# Patient Record
Sex: FEMALE | Race: WHITE | NOT HISPANIC OR LATINO | URBAN - METROPOLITAN AREA
[De-identification: names, ages, dates, MRNs, and addresses within clinical notes are randomized per-mention and may not be internally consistent; named-entity substitution may affect disease eponyms.]

---

## 2021-06-14 ENCOUNTER — ON CAMPUS - OUTPATIENT (AMBULATORY)
Dept: URBAN - METROPOLITAN AREA HOSPITAL 2 | Facility: HOSPITAL | Age: 51
End: 2021-06-14

## 2021-06-14 VITALS
OXYGEN SATURATION: 100 % | DIASTOLIC BLOOD PRESSURE: 71 MMHG | SYSTOLIC BLOOD PRESSURE: 123 MMHG | RESPIRATION RATE: 18 BRPM | SYSTOLIC BLOOD PRESSURE: 109 MMHG | DIASTOLIC BLOOD PRESSURE: 70 MMHG | DIASTOLIC BLOOD PRESSURE: 83 MMHG | HEART RATE: 76 BPM | SYSTOLIC BLOOD PRESSURE: 122 MMHG | TEMPERATURE: 97.7 F | OXYGEN SATURATION: 99 % | SYSTOLIC BLOOD PRESSURE: 112 MMHG | DIASTOLIC BLOOD PRESSURE: 75 MMHG | DIASTOLIC BLOOD PRESSURE: 61 MMHG | HEART RATE: 57 BPM | HEART RATE: 60 BPM | SYSTOLIC BLOOD PRESSURE: 128 MMHG | WEIGHT: 155 LBS | OXYGEN SATURATION: 96 % | SYSTOLIC BLOOD PRESSURE: 117 MMHG | HEIGHT: 66 IN | SYSTOLIC BLOOD PRESSURE: 100 MMHG | OXYGEN SATURATION: 95 % | SYSTOLIC BLOOD PRESSURE: 105 MMHG | DIASTOLIC BLOOD PRESSURE: 81 MMHG | DIASTOLIC BLOOD PRESSURE: 86 MMHG | HEART RATE: 72 BPM | RESPIRATION RATE: 16 BRPM | HEART RATE: 84 BPM | HEART RATE: 54 BPM

## 2021-06-14 DIAGNOSIS — Z12.11 ENCOUNTER FOR SCREENING FOR MALIGNANT NEOPLASM OF COLON: ICD-10-CM

## 2021-06-14 DIAGNOSIS — K64.1 SECOND DEGREE HEMORRHOIDS: ICD-10-CM

## 2021-06-14 PROCEDURE — 45378 DIAGNOSTIC COLONOSCOPY: CPT | Mod: 33 | Performed by: INTERNAL MEDICINE

## 2023-01-10 ENCOUNTER — OFFICE VISIT (OUTPATIENT)
Dept: FAMILY MEDICINE CLINIC | Facility: CLINIC | Age: 53
End: 2023-01-10
Payer: COMMERCIAL

## 2023-01-10 ENCOUNTER — PATIENT ROUNDING (BHMG ONLY) (OUTPATIENT)
Dept: FAMILY MEDICINE CLINIC | Facility: CLINIC | Age: 53
End: 2023-01-10

## 2023-01-10 VITALS
HEIGHT: 66 IN | BODY MASS INDEX: 23.46 KG/M2 | WEIGHT: 146 LBS | TEMPERATURE: 98 F | DIASTOLIC BLOOD PRESSURE: 90 MMHG | SYSTOLIC BLOOD PRESSURE: 120 MMHG | OXYGEN SATURATION: 98 % | HEART RATE: 65 BPM | RESPIRATION RATE: 18 BRPM

## 2023-01-10 DIAGNOSIS — Z13.0 SCREENING FOR ENDOCRINE, METABOLIC AND IMMUNITY DISORDER: ICD-10-CM

## 2023-01-10 DIAGNOSIS — E03.9 HYPOTHYROIDISM, UNSPECIFIED TYPE: ICD-10-CM

## 2023-01-10 DIAGNOSIS — Z13.220 SCREENING FOR LIPID DISORDERS: ICD-10-CM

## 2023-01-10 DIAGNOSIS — Z76.89 ENCOUNTER TO ESTABLISH CARE: ICD-10-CM

## 2023-01-10 DIAGNOSIS — Z13.29 SCREENING FOR ENDOCRINE, METABOLIC AND IMMUNITY DISORDER: ICD-10-CM

## 2023-01-10 DIAGNOSIS — Z13.228 SCREENING FOR ENDOCRINE, METABOLIC AND IMMUNITY DISORDER: ICD-10-CM

## 2023-01-10 DIAGNOSIS — Z13.29 SCREENING FOR THYROID DISORDER: ICD-10-CM

## 2023-01-10 DIAGNOSIS — F41.9 ANXIETY: Primary | ICD-10-CM

## 2023-01-10 DIAGNOSIS — F32.A DEPRESSION, UNSPECIFIED DEPRESSION TYPE: ICD-10-CM

## 2023-01-10 DIAGNOSIS — Z13.1 SCREENING FOR DIABETES MELLITUS: ICD-10-CM

## 2023-01-10 PROBLEM — N85.9 LESION OF ENDOMETRIUM: Status: ACTIVE | Noted: 2023-01-10

## 2023-01-10 PROBLEM — C50.919 MALIGNANT NEOPLASM OF BREAST: Status: ACTIVE | Noted: 2023-01-10

## 2023-01-10 PROBLEM — Z79.810 LONG TERM CURRENT USE OF SELECTIVE ESTROGEN RECEPTOR MODULATORS (SERMS): Status: ACTIVE | Noted: 2022-07-13

## 2023-01-10 PROBLEM — Z90.12 ACQUIRED ABSENCE OF LEFT BREAST: Status: ACTIVE | Noted: 2020-03-26

## 2023-01-10 PROBLEM — N95.0 POSTMENOPAUSAL BLEEDING: Status: ACTIVE | Noted: 2022-07-13

## 2023-01-10 PROBLEM — D05.12 DUCTAL CARCINOMA IN SITU (DCIS) OF LEFT BREAST: Status: ACTIVE | Noted: 2020-04-22

## 2023-01-10 PROBLEM — Z79.810 SERM USE (SELECTIVE ESTROGEN RECEPTOR MODULATOR): Status: ACTIVE | Noted: 2020-10-05

## 2023-01-10 PROCEDURE — 99205 OFFICE O/P NEW HI 60 MIN: CPT | Performed by: REGISTERED NURSE

## 2023-01-10 RX ORDER — BUSPIRONE HYDROCHLORIDE 5 MG/1
5 TABLET ORAL 3 TIMES DAILY PRN
Qty: 60 TABLET | Refills: 3 | Status: SHIPPED | OUTPATIENT
Start: 2023-01-10

## 2023-01-10 RX ORDER — SERTRALINE HYDROCHLORIDE 25 MG/1
25 TABLET, FILM COATED ORAL DAILY
Qty: 30 TABLET | Refills: 5 | Status: SHIPPED | OUTPATIENT
Start: 2023-01-10 | End: 2023-02-02

## 2023-01-10 RX ORDER — LEVOTHYROXINE SODIUM 100 MCG
1 TABLET ORAL DAILY
COMMUNITY
Start: 2022-12-10 | End: 2023-02-07

## 2023-01-10 RX ORDER — LEVOTHYROXINE SODIUM 0.1 MG/1
100 TABLET ORAL DAILY
COMMUNITY
Start: 2022-09-08 | End: 2023-02-07

## 2023-01-10 RX ORDER — ASPIRIN 81 MG
100 TABLET, DELAYED RELEASE (ENTERIC COATED) ORAL
COMMUNITY
Start: 2022-09-13

## 2023-01-10 RX ORDER — IBUPROFEN 600 MG/1
600 TABLET ORAL
COMMUNITY
Start: 2022-09-13

## 2023-01-25 ENCOUNTER — CLINICAL SUPPORT (OUTPATIENT)
Dept: FAMILY MEDICINE CLINIC | Facility: CLINIC | Age: 53
End: 2023-01-25
Payer: COMMERCIAL

## 2023-01-25 DIAGNOSIS — Z13.29 SCREENING FOR THYROID DISORDER: ICD-10-CM

## 2023-01-25 DIAGNOSIS — Z13.220 SCREENING FOR LIPID DISORDERS: ICD-10-CM

## 2023-01-25 DIAGNOSIS — Z13.228 SCREENING FOR ENDOCRINE, METABOLIC AND IMMUNITY DISORDER: ICD-10-CM

## 2023-01-25 DIAGNOSIS — Z13.1 SCREENING FOR DIABETES MELLITUS: ICD-10-CM

## 2023-01-25 DIAGNOSIS — Z13.0 SCREENING FOR ENDOCRINE, METABOLIC AND IMMUNITY DISORDER: ICD-10-CM

## 2023-01-25 DIAGNOSIS — Z13.29 SCREENING FOR ENDOCRINE, METABOLIC AND IMMUNITY DISORDER: ICD-10-CM

## 2023-01-25 LAB — HBA1C MFR BLD: 5.2 % (ref 3.5–5.6)

## 2023-01-25 PROCEDURE — 80050 GENERAL HEALTH PANEL: CPT | Performed by: REGISTERED NURSE

## 2023-01-25 PROCEDURE — 36415 COLL VENOUS BLD VENIPUNCTURE: CPT | Performed by: REGISTERED NURSE

## 2023-01-25 PROCEDURE — 83036 HEMOGLOBIN GLYCOSYLATED A1C: CPT | Performed by: REGISTERED NURSE

## 2023-01-25 PROCEDURE — 80061 LIPID PANEL: CPT | Performed by: REGISTERED NURSE

## 2023-01-26 LAB
ALBUMIN SERPL-MCNC: 4.2 G/DL (ref 3.5–5.2)
ALBUMIN/GLOB SERPL: 1.4 G/DL
ALP SERPL-CCNC: 107 U/L (ref 39–117)
ALT SERPL W P-5'-P-CCNC: 18 U/L (ref 1–33)
ANION GAP SERPL CALCULATED.3IONS-SCNC: 9.3 MMOL/L (ref 5–15)
AST SERPL-CCNC: 22 U/L (ref 1–32)
BASOPHILS # BLD AUTO: 0.02 10*3/MM3 (ref 0–0.2)
BASOPHILS NFR BLD AUTO: 0.4 % (ref 0–1.5)
BILIRUB SERPL-MCNC: 0.2 MG/DL (ref 0–1.2)
BUN SERPL-MCNC: 25 MG/DL (ref 6–20)
BUN/CREAT SERPL: 28.1 (ref 7–25)
CALCIUM SPEC-SCNC: 9.3 MG/DL (ref 8.6–10.5)
CHLORIDE SERPL-SCNC: 105 MMOL/L (ref 98–107)
CHOLEST SERPL-MCNC: 208 MG/DL (ref 0–200)
CO2 SERPL-SCNC: 29.7 MMOL/L (ref 22–29)
CREAT SERPL-MCNC: 0.89 MG/DL (ref 0.57–1)
DEPRECATED RDW RBC AUTO: 38.2 FL (ref 37–54)
EGFRCR SERPLBLD CKD-EPI 2021: 78.6 ML/MIN/1.73
EOSINOPHIL # BLD AUTO: 0.06 10*3/MM3 (ref 0–0.4)
EOSINOPHIL NFR BLD AUTO: 1.1 % (ref 0.3–6.2)
ERYTHROCYTE [DISTWIDTH] IN BLOOD BY AUTOMATED COUNT: 11.8 % (ref 12.3–15.4)
GLOBULIN UR ELPH-MCNC: 3.1 GM/DL
GLUCOSE SERPL-MCNC: 80 MG/DL (ref 65–99)
HCT VFR BLD AUTO: 40.6 % (ref 34–46.6)
HDLC SERPL-MCNC: 74 MG/DL (ref 40–60)
HGB BLD-MCNC: 13.6 G/DL (ref 12–15.9)
IMM GRANULOCYTES # BLD AUTO: 0.01 10*3/MM3 (ref 0–0.05)
IMM GRANULOCYTES NFR BLD AUTO: 0.2 % (ref 0–0.5)
LDLC SERPL CALC-MCNC: 120 MG/DL (ref 0–100)
LDLC/HDLC SERPL: 1.6 {RATIO}
LYMPHOCYTES # BLD AUTO: 1.12 10*3/MM3 (ref 0.7–3.1)
LYMPHOCYTES NFR BLD AUTO: 21.1 % (ref 19.6–45.3)
MCH RBC QN AUTO: 29.6 PG (ref 26.6–33)
MCHC RBC AUTO-ENTMCNC: 33.5 G/DL (ref 31.5–35.7)
MCV RBC AUTO: 88.3 FL (ref 79–97)
MONOCYTES # BLD AUTO: 0.53 10*3/MM3 (ref 0.1–0.9)
MONOCYTES NFR BLD AUTO: 10 % (ref 5–12)
NEUTROPHILS NFR BLD AUTO: 3.57 10*3/MM3 (ref 1.7–7)
NEUTROPHILS NFR BLD AUTO: 67.2 % (ref 42.7–76)
NRBC BLD AUTO-RTO: 0 /100 WBC (ref 0–0.2)
PLATELET # BLD AUTO: 208 10*3/MM3 (ref 140–450)
PMV BLD AUTO: 10.9 FL (ref 6–12)
POTASSIUM SERPL-SCNC: 4.3 MMOL/L (ref 3.5–5.2)
PROT SERPL-MCNC: 7.3 G/DL (ref 6–8.5)
RBC # BLD AUTO: 4.6 10*6/MM3 (ref 3.77–5.28)
SODIUM SERPL-SCNC: 144 MMOL/L (ref 136–145)
TRIGL SERPL-MCNC: 79 MG/DL (ref 0–150)
TSH SERPL DL<=0.05 MIU/L-ACNC: 0.23 UIU/ML (ref 0.27–4.2)
VLDLC SERPL-MCNC: 14 MG/DL (ref 5–40)
WBC NRBC COR # BLD: 5.31 10*3/MM3 (ref 3.4–10.8)

## 2023-02-02 DIAGNOSIS — F41.9 ANXIETY: ICD-10-CM

## 2023-02-02 DIAGNOSIS — F32.A DEPRESSION, UNSPECIFIED DEPRESSION TYPE: ICD-10-CM

## 2023-02-02 RX ORDER — SERTRALINE HYDROCHLORIDE 25 MG/1
TABLET, FILM COATED ORAL
Qty: 30 TABLET | Refills: 5 | Status: SHIPPED | OUTPATIENT
Start: 2023-02-02

## 2023-02-07 RX ORDER — LEVOTHYROXINE SODIUM 88 UG/1
88 TABLET ORAL DAILY
Qty: 30 TABLET | Refills: 5 | Status: SHIPPED | OUTPATIENT
Start: 2023-02-07 | End: 2023-02-20 | Stop reason: SDUPTHER

## 2023-02-20 DIAGNOSIS — E03.9 HYPOTHYROIDISM, UNSPECIFIED TYPE: ICD-10-CM

## 2023-02-20 NOTE — TELEPHONE ENCOUNTER
Pt is wanting a script written for NAME brand only on her thyroid medication. She wants it filled as Synthroid 88 MCG.

## 2023-02-21 RX ORDER — LEVOTHYROXINE SODIUM 88 MCG
88 TABLET ORAL DAILY
Qty: 30 TABLET | Refills: 5 | Status: SHIPPED | OUTPATIENT
Start: 2023-02-21 | End: 2023-03-16

## 2023-03-13 ENCOUNTER — OFFICE VISIT (OUTPATIENT)
Dept: FAMILY MEDICINE CLINIC | Facility: CLINIC | Age: 53
End: 2023-03-13
Payer: COMMERCIAL

## 2023-03-13 VITALS
HEART RATE: 61 BPM | TEMPERATURE: 98.1 F | OXYGEN SATURATION: 96 % | BODY MASS INDEX: 23.46 KG/M2 | RESPIRATION RATE: 18 BRPM | WEIGHT: 146 LBS | DIASTOLIC BLOOD PRESSURE: 78 MMHG | HEIGHT: 66 IN | SYSTOLIC BLOOD PRESSURE: 120 MMHG

## 2023-03-13 DIAGNOSIS — E03.9 HYPOTHYROIDISM, UNSPECIFIED TYPE: Primary | ICD-10-CM

## 2023-03-13 DIAGNOSIS — F41.9 ANXIETY: ICD-10-CM

## 2023-03-13 DIAGNOSIS — F32.A DEPRESSION, UNSPECIFIED DEPRESSION TYPE: ICD-10-CM

## 2023-03-13 PROCEDURE — 99215 OFFICE O/P EST HI 40 MIN: CPT | Performed by: REGISTERED NURSE
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
## 2023-03-16 DIAGNOSIS — E03.9 HYPOTHYROIDISM, UNSPECIFIED TYPE: ICD-10-CM

## 2023-03-16 RX ORDER — LEVOTHYROXINE SODIUM 88 MCG
TABLET ORAL
Qty: 30 TABLET | Refills: 5 | Status: SHIPPED | OUTPATIENT
Start: 2023-03-16

## 2023-03-20 ENCOUNTER — CLINICAL SUPPORT (OUTPATIENT)
Dept: FAMILY MEDICINE CLINIC | Facility: CLINIC | Age: 53
End: 2023-03-20
Payer: COMMERCIAL

## 2023-03-20 DIAGNOSIS — E03.9 HYPOTHYROIDISM, UNSPECIFIED TYPE: ICD-10-CM

## 2023-03-20 DIAGNOSIS — Z13.29 SCREENING FOR THYROID DISORDER: ICD-10-CM

## 2023-03-20 PROCEDURE — 36415 COLL VENOUS BLD VENIPUNCTURE: CPT | Performed by: REGISTERED NURSE

## 2023-03-20 PROCEDURE — 84443 ASSAY THYROID STIM HORMONE: CPT | Performed by: REGISTERED NURSE

## 2023-03-20 NOTE — PROGRESS NOTES
Venipuncture Blood Specimen Collection  Venipuncture performed in R ARM by Liz Callejas MA with good hemostasis. Patient tolerated the procedure well without complications.   03/20/23   Liz Callejas MA

## 2023-03-21 LAB — TSH SERPL DL<=0.05 MIU/L-ACNC: 0.41 UIU/ML (ref 0.27–4.2)

## 2023-03-22 ENCOUNTER — TELEPHONE (OUTPATIENT)
Dept: FAMILY MEDICINE CLINIC | Facility: CLINIC | Age: 53
End: 2023-03-22

## 2023-03-22 NOTE — TELEPHONE ENCOUNTER
Caller: Sabiha Hernandez    Relationship to patient: Self    Best call back number: [phone]    Patient is needing: LAB RESULTS

## 2023-05-08 ENCOUNTER — TELEPHONE (OUTPATIENT)
Dept: FAMILY MEDICINE CLINIC | Facility: CLINIC | Age: 53
End: 2023-05-08

## 2023-05-08 NOTE — TELEPHONE ENCOUNTER
Caller: Sabiha Hernandez    Relationship to patient: Self    Best call back number: [phone]    Chief complaint: WAS CHECKED FOR COVID, ADVISED TO GET A PULMONARY TEST WITH PCP    Type of visit: HOSPITAL FOLLOW UP    Requested date: NEXT AVAILABLE    Additional notes: PATIENT STATES SHE WAS DISCHARGED FROM [facility] ON 04/25/23, DUE TO GETTING CHECKED FOR COVID AND WAS ADVISED TO COMPLETE A PULMONARY TEST ABOUT 4 WEEKS FROM 04/25/23. [facility] WAS UNABLE TO SCHEDULE PATIENT'S HOSPITAL FOLLOW UP DUE TO IT BEING OUT OF 7 DAY TIME FRAME, PER [facility] WORKFLOW.     [facility] ATTEMPTED TO WARM TRANSFER AND WAS UNSUCCESSFUL

## 2023-05-22 ENCOUNTER — OFFICE VISIT (OUTPATIENT)
Dept: FAMILY MEDICINE CLINIC | Facility: CLINIC | Age: 53
End: 2023-05-22
Payer: COMMERCIAL

## 2023-05-22 VITALS
TEMPERATURE: 98.5 F | HEIGHT: 66 IN | RESPIRATION RATE: 18 BRPM | HEART RATE: 59 BPM | BODY MASS INDEX: 23.78 KG/M2 | SYSTOLIC BLOOD PRESSURE: 102 MMHG | DIASTOLIC BLOOD PRESSURE: 80 MMHG | OXYGEN SATURATION: 98 % | WEIGHT: 148 LBS

## 2023-05-22 DIAGNOSIS — R05.1 ACUTE COUGH: ICD-10-CM

## 2023-05-22 DIAGNOSIS — R07.81 RIB PAIN ON RIGHT SIDE: Primary | ICD-10-CM

## 2023-05-22 DIAGNOSIS — R93.89 ABNORMAL X-RAY: ICD-10-CM
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
## 2023-07-11 ENCOUNTER — TELEPHONE (OUTPATIENT)
Dept: FAMILY MEDICINE CLINIC | Facility: CLINIC | Age: 53
End: 2023-07-11

## 2023-07-11 NOTE — TELEPHONE ENCOUNTER
Caller: Sabiha Hernandez    Relationship to patient: Self    Best call back number: [phone]    Patient is needing:     WOULD LIKE TO HAVE THE ORDER FOR CT SCAN AND THE AUTHORIZATION FAXED TO [facility]. THEY ARE STATING THE HAVE NOT RECEIVED IT.

## 2023-07-24 ENCOUNTER — CLINICAL SUPPORT (OUTPATIENT)
Dept: FAMILY MEDICINE CLINIC | Facility: CLINIC | Age: 53
End: 2023-07-24
Payer: COMMERCIAL

## 2023-07-24 DIAGNOSIS — S01.01XD LACERATION OF SCALP WITHOUT FOREIGN BODY, SUBSEQUENT ENCOUNTER: Primary | ICD-10-CM

## 2023-07-24 DIAGNOSIS — L08.9 SKIN INFECTION: ICD-10-CM

## 2023-07-24 DIAGNOSIS — L24.A9 DRAINAGE FROM WOUND: ICD-10-CM

## 2023-07-24 RX ORDER — SULFAMETHOXAZOLE AND TRIMETHOPRIM 800; 160 MG/1; MG/1
1 TABLET ORAL 2 TIMES DAILY
Qty: 20 TABLET | Refills: 0 | Status: SHIPPED | OUTPATIENT
Start: 2023-07-24 | End: 2023-08-03
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
## 2023-09-07 ENCOUNTER — TELEPHONE (OUTPATIENT)
Dept: FAMILY MEDICINE CLINIC | Facility: CLINIC | Age: 53
End: 2023-09-07

## 2023-09-07 DIAGNOSIS — Z12.31 ENCOUNTER FOR SCREENING MAMMOGRAM FOR MALIGNANT NEOPLASM OF BREAST: Primary | ICD-10-CM

## 2023-09-07 NOTE — TELEPHONE ENCOUNTER
Caller: Sabiha Hernandez    Relationship: Self    Best call back number: [phone] (Mobile)     What orders are you requesting (i.e. lab or imaging): MAMMOGRAM     In what timeframe would the patient need to come in:     Where will you receive your lab/imaging services: [facility]     Additional notes:

## 2023-09-11 NOTE — TELEPHONE ENCOUNTER
HUB IS INSTRUCTED TO READ BELOW MESSAGE     LVM FOR PT LETTING HER KNOW THAT MAMMO ORDER HAS BEEN [facility]

## 2023-10-20 DIAGNOSIS — E03.9 HYPOTHYROIDISM, UNSPECIFIED TYPE: ICD-10-CM

## 2023-10-20 RX ORDER — LEVOTHYROXINE SODIUM 88 MCG
88 TABLET ORAL DAILY
Qty: 30 TABLET | Refills: 5 | Status: SHIPPED | OUTPATIENT
Start: 2023-10-20

## 2023-10-27 ENCOUNTER — OFFICE VISIT (OUTPATIENT)
Dept: FAMILY MEDICINE CLINIC | Facility: CLINIC | Age: 53
End: 2023-10-27
Payer: COMMERCIAL

## 2023-10-27 VITALS
WEIGHT: 141 LBS | SYSTOLIC BLOOD PRESSURE: 100 MMHG | OXYGEN SATURATION: 96 % | DIASTOLIC BLOOD PRESSURE: 60 MMHG | RESPIRATION RATE: 18 BRPM | HEIGHT: 66 IN | BODY MASS INDEX: 22.66 KG/M2 | TEMPERATURE: 97.8 F | HEART RATE: 86 BPM

## 2023-10-27 DIAGNOSIS — Z13.220 SCREENING FOR LIPID DISORDERS: ICD-10-CM

## 2023-10-27 DIAGNOSIS — E03.9 HYPOTHYROIDISM, UNSPECIFIED TYPE: ICD-10-CM

## 2023-10-27 DIAGNOSIS — Z13.29 SCREENING FOR ENDOCRINE, METABOLIC AND IMMUNITY DISORDER: ICD-10-CM

## 2023-10-27 DIAGNOSIS — Z23 NEED FOR IMMUNIZATION AGAINST INFLUENZA: Primary | ICD-10-CM

## 2023-10-27 DIAGNOSIS — Z13.228 SCREENING FOR ENDOCRINE, METABOLIC AND IMMUNITY DISORDER: ICD-10-CM

## 2023-10-27 DIAGNOSIS — Z13.0 SCREENING FOR ENDOCRINE, METABOLIC AND IMMUNITY DISORDER: ICD-10-CM

## 2023-10-27 PROBLEM — Z12.11 SCREEN FOR COLON CANCER: Status: ACTIVE | Noted: 2023-10-27

## 2023-10-27 NOTE — PROGRESS NOTES
Chief Complaint  Follow-up, Med Refill, and Immunizations (Patient is wanting flu vaccine today. )    Subjective    History of Present Illness {CC  Problem List  Visit  Diagnosis   Encounters  Notes  Medications  Labs  Result Review Imaging  Media :23}     Sabiha Hernandez presents to [facility] for Follow-up, Med Refill, and Immunizations (Patient is wanting flu vaccine today. ).      History of Present Illness  Patient is a 53 y.o. female  presents to the clinic today for 3-month follow-up for hypothyroidism.  Patient denies any chest pain, shortness of breath, or any fevers.  Patient denies any known exposure to COVID, flu, or any other contagious illnesses.    In regards to hypothyroidism, she currently takes synthroid 88 mcg daily to manage her hypothyroidism. She started this approximately 9 months ago due to her TSH level being low. She has no issues to report. She takes her medication as prescribed.  Patient has no questions or concerns in regards to her hypothyroidism or her Synthroid at this time.    In regards to depression with anxiety, patient shares that she no longer needs this medication.  She has reported that she has stopped taking her antidepressant and anxiety medication as circumstances have improved significantly for her.    We discussed getting flu shot today.  Patient is agreeable to flu shot at this time.  Education provided in regards to flu vaccination.  Patient would like to come back tomorrow fasting for her blood work.        Review of Systems   Constitutional: Negative.  Negative for activity change, appetite change, chills, diaphoresis, fatigue and fever.   HENT: Negative.  Negative for congestion, dental problem, ear pain, hearing loss, rhinorrhea, sinus pain, sore throat, tinnitus and trouble swallowing.    Eyes: Negative.  Negative for pain and visual disturbance.   Respiratory: Negative.  Negative for cough, chest tightness, shortness of breath  "and wheezing.    Cardiovascular: Negative.  Negative for chest pain, palpitations and leg swelling.   Gastrointestinal: Negative.  Negative for abdominal pain, diarrhea, nausea and vomiting.   Endocrine: Negative.  Negative for polydipsia, polyphagia and polyuria.   Genitourinary: Negative.  Negative for difficulty urinating, dysuria, frequency and urgency.   Musculoskeletal: Negative.  Negative for arthralgias, back pain and myalgias.   Skin: Negative.  Negative for color change, pallor, rash and wound.   Allergic/Immunologic: Negative.  Negative for environmental allergies.   Neurological: Negative.  Negative for dizziness, tremors, speech difficulty, weakness, light-headedness, numbness and headaches.   Hematological: Negative.    Psychiatric/Behavioral: Negative.  Negative for confusion, decreased concentration, self-injury, sleep disturbance and suicidal ideas. The patient is not nervous/anxious.    All other systems reviewed and are negative.       Objective     Vital Signs:   /60 (BP Location: Left arm, Patient Position: Sitting, Cuff Size: Adult)   Pulse 86   Temp 97.8 °F (36.6 °C) (Oral)   Resp 18   Ht 167.6 cm (66\")   Wt 64 kg (141 lb)   SpO2 96%   BMI 22.76 kg/m²   Current Outpatient Medications on File Prior to Visit   Medication Sig Dispense Refill    Synthroid 88 MCG tablet Take 1 tablet by mouth Daily. 30 tablet 5    [DISCONTINUED] busPIRone (BUSPAR) 5 MG tablet Take 1 tablet by mouth 3 (Three) Times a Day As Needed (anxiety). (Patient not taking: Reported on 10/27/2023) 60 tablet 3    [DISCONTINUED] Cholecalciferol (VITAMIN D3 PO) Take  by mouth. (Patient not taking: Reported on 10/27/2023)      [DISCONTINUED] sertraline (ZOLOFT) 25 MG tablet TAKE 1 TABLET BY MOUTH EVERY DAY (Patient not taking: Reported on 5/22/2023) 30 tablet 5     No current facility-administered medications on file prior to visit.        Past Medical History:   Diagnosis Date    Anxiety     Breast cancer in female  "    left breast    Cancer     Depression     Headache     Hypothyroidism       Past Surgical History:   Procedure Laterality Date    BREAST SURGERY Left     HYSTERECTOMY      complete    MASTECTOMY COMPLETE / SIMPLE Left     TOTAL THYROIDECTOMY      TUBAL ABDOMINAL LIGATION        Family History   Problem Relation Age of Onset    Thyroid disease Mother     Hypertension Mother     Stroke Father     Heart disease Father     Diabetes Brother     Cancer Maternal Grandmother     Colon cancer Maternal Grandmother     COPD Maternal Grandfather     Heart disease Paternal Grandmother     Heart disease Paternal Grandfather       Social History     Socioeconomic History    Marital status:    Tobacco Use    Smoking status: Former     Packs/day: 1.00     Years: 31.00     Additional pack years: 0.00     Total pack years: 31.00     Types: Cigarettes     Start date:      Quit date: 2017     Years since quittin.9    Smokeless tobacco: Never   Substance and Sexual Activity    Alcohol use: Not Currently    Drug use: Never    Sexual activity: Defer         No visits with results within 3 Month(s) from this visit.   Latest known visit with results is:   Clinical Support on 2023   Component Date Value Ref Range Status    TSH 2023 0.415  0.270 - 4.200 uIU/mL Final         Physical Exam  Vitals and nursing note reviewed.   Constitutional:       Appearance: Normal appearance. She is normal weight.   HENT:      Head: Normocephalic and atraumatic.      Nose: Nose normal.   Eyes:      Extraocular Movements: Extraocular movements intact.      Conjunctiva/sclera: Conjunctivae normal.   Cardiovascular:      Rate and Rhythm: Normal rate and regular rhythm.      Pulses: Normal pulses.      Heart sounds: Normal heart sounds. No murmur heard.     No friction rub. No gallop.   Pulmonary:      Effort: Pulmonary effort is normal. No respiratory distress.      Breath sounds: Normal breath sounds. No stridor. No wheezing, rhonchi  or rales.   Chest:      Chest wall: No tenderness.   Abdominal:      General: Abdomen is flat. Bowel sounds are normal. There is no distension.      Palpations: Abdomen is soft. There is no mass.      Tenderness: There is no abdominal tenderness. There is no right CVA tenderness, left CVA tenderness, guarding or rebound.      Hernia: No hernia is present.   Musculoskeletal:         General: Normal range of motion.      Cervical back: Normal range of motion.   Skin:     General: Skin is warm and dry.      Capillary Refill: Capillary refill takes less than 2 seconds.      Coloration: Skin is not jaundiced or pale.   Neurological:      General: No focal deficit present.      Mental Status: She is alert and oriented to person, place, and time. Mental status is at baseline.      Motor: No weakness.      Coordination: Coordination normal.      Gait: Gait normal.   Psychiatric:         Mood and Affect: Mood normal.         Behavior: Behavior normal.         Thought Content: Thought content normal.         Judgment: Judgment normal.          Result Review  Data Reviewed:{ Labs  Result Review  Imaging  Med Tab  Media :23}   I have reviewed this patient's chart.  I have reviewed previous labs, previous imaging, previous medications, and previous encounters with notes that were available in this patient's chart.               Assessment and Plan {CC Problem List  Visit Diagnosis  ROS  Review (Popup)  [facility]  Quality  BestPractice  Medications  SmartSets  SnapShot Encounters  Media :23}   Diagnoses and all orders for this visit:    1. Need for immunization against influenza (Primary)  -     Fluzone >6 Months (0623-1154)    2. Hypothyroidism, unspecified type  -     TSH; Future    3. Screening for lipid disorders  -     Lipid Panel; Future    4. Screening for endocrine, metabolic and immunity disorder  -     CBC & Differential; Future  -     Comprehensive Metabolic Panel; Future  -     Hemoglobin A1c;  Future        -Administered influenza vaccine.  -Ordered labs, will come on Monday morning to receive due to not fasting.   -ER red flags discussed with patient including risk versus benefit and education provided.  -Follow-up with me 3 months or sooner if needed.     I spent 30 minutes caring for Sabiha on this date of service. This time includes time spent by me in the following activities:preparing for the visit, reviewing tests, obtaining and/or reviewing a separately obtained history, performing a medically appropriate examination and/or evaluation , counseling and educating the patient/family/caregiver, ordering medications, tests, or procedures, referring and communicating with other health care professionals , documenting information in the medical record, independently interpreting results and communicating that information with the patient/family/caregiver, and care coordination.    Follow Up {Instructions Charge Capture  Follow-up Communications :23}     Patient was given instructions and counseling regarding her condition or for health maintenance advice. Please see specific information pulled into the AVS (placed there by myself) if appropriate.    Return in about 3 months (around 1/27/2024).    BMI is within normal parameters. No other follow-up for BMI required.       Rickey Jamil APRN

## 2023-11-06 ENCOUNTER — CLINICAL SUPPORT (OUTPATIENT)
Dept: FAMILY MEDICINE CLINIC | Facility: CLINIC | Age: 53
End: 2023-11-06
Payer: COMMERCIAL

## 2023-11-06 DIAGNOSIS — Z13.220 SCREENING FOR LIPID DISORDERS: ICD-10-CM

## 2023-11-06 DIAGNOSIS — Z13.0 SCREENING FOR ENDOCRINE, METABOLIC AND IMMUNITY DISORDER: ICD-10-CM

## 2023-11-06 DIAGNOSIS — Z13.29 SCREENING FOR ENDOCRINE, METABOLIC AND IMMUNITY DISORDER: ICD-10-CM

## 2023-11-06 DIAGNOSIS — E03.9 HYPOTHYROIDISM, UNSPECIFIED TYPE: ICD-10-CM

## 2023-11-06 DIAGNOSIS — Z13.228 SCREENING FOR ENDOCRINE, METABOLIC AND IMMUNITY DISORDER: ICD-10-CM

## 2023-11-06 PROCEDURE — 80053 COMPREHEN METABOLIC PANEL: CPT | Performed by: REGISTERED NURSE

## 2023-11-06 PROCEDURE — 85025 COMPLETE CBC W/AUTO DIFF WBC: CPT | Performed by: REGISTERED NURSE

## 2023-11-06 PROCEDURE — 36415 COLL VENOUS BLD VENIPUNCTURE: CPT | Performed by: REGISTERED NURSE

## 2023-11-06 PROCEDURE — 83036 HEMOGLOBIN GLYCOSYLATED A1C: CPT | Performed by: REGISTERED NURSE

## 2023-11-06 PROCEDURE — 84443 ASSAY THYROID STIM HORMONE: CPT | Performed by: REGISTERED NURSE

## 2023-11-06 PROCEDURE — 80061 LIPID PANEL: CPT | Performed by: REGISTERED NURSE

## 2023-11-06 NOTE — PROGRESS NOTES
Venipuncture Blood Specimen Collection  Venipuncture performed in Left arm via butterfly by Modesta Delvalle CMA with good hemostasis. Patient tolerated the procedure well without complications.   11/06/23   Modesta Delvalle CMA

## 2023-11-07 LAB
ALBUMIN SERPL-MCNC: 4.6 G/DL (ref 3.5–5.2)
ALBUMIN/GLOB SERPL: 1.9 G/DL
ALP SERPL-CCNC: 91 U/L (ref 39–117)
ALT SERPL W P-5'-P-CCNC: 20 U/L (ref 1–33)
ANION GAP SERPL CALCULATED.3IONS-SCNC: 7 MMOL/L (ref 5–15)
AST SERPL-CCNC: 25 U/L (ref 1–32)
BASOPHILS # BLD AUTO: 0.02 10*3/MM3 (ref 0–0.2)
BASOPHILS NFR BLD AUTO: 0.3 % (ref 0–1.5)
BILIRUB SERPL-MCNC: 0.3 MG/DL (ref 0–1.2)
BUN SERPL-MCNC: 19 MG/DL (ref 6–20)
BUN/CREAT SERPL: 18.6 (ref 7–25)
CALCIUM SPEC-SCNC: 9.5 MG/DL (ref 8.6–10.5)
CHLORIDE SERPL-SCNC: 105 MMOL/L (ref 98–107)
CHOLEST SERPL-MCNC: 237 MG/DL (ref 0–200)
CO2 SERPL-SCNC: 30 MMOL/L (ref 22–29)
CREAT SERPL-MCNC: 1.02 MG/DL (ref 0.57–1)
DEPRECATED RDW RBC AUTO: 38.6 FL (ref 37–54)
EGFRCR SERPLBLD CKD-EPI 2021: 66.3 ML/MIN/1.73
EOSINOPHIL # BLD AUTO: 0.02 10*3/MM3 (ref 0–0.4)
EOSINOPHIL NFR BLD AUTO: 0.3 % (ref 0.3–6.2)
ERYTHROCYTE [DISTWIDTH] IN BLOOD BY AUTOMATED COUNT: 11.9 % (ref 12.3–15.4)
GLOBULIN UR ELPH-MCNC: 2.4 GM/DL
GLUCOSE SERPL-MCNC: 83 MG/DL (ref 65–99)
HBA1C MFR BLD: 5.6 % (ref 4.8–5.6)
HCT VFR BLD AUTO: 41.7 % (ref 34–46.6)
HDLC SERPL-MCNC: 91 MG/DL (ref 40–60)
HGB BLD-MCNC: 13.9 G/DL (ref 12–15.9)
IMM GRANULOCYTES # BLD AUTO: 0.02 10*3/MM3 (ref 0–0.05)
IMM GRANULOCYTES NFR BLD AUTO: 0.3 % (ref 0–0.5)
LDLC SERPL CALC-MCNC: 137 MG/DL (ref 0–100)
LDLC/HDLC SERPL: 1.49 {RATIO}
LYMPHOCYTES # BLD AUTO: 0.61 10*3/MM3 (ref 0.7–3.1)
LYMPHOCYTES NFR BLD AUTO: 10.6 % (ref 19.6–45.3)
MCH RBC QN AUTO: 29.6 PG (ref 26.6–33)
MCHC RBC AUTO-ENTMCNC: 33.3 G/DL (ref 31.5–35.7)
MCV RBC AUTO: 88.7 FL (ref 79–97)
MONOCYTES # BLD AUTO: 0.44 10*3/MM3 (ref 0.1–0.9)
MONOCYTES NFR BLD AUTO: 7.6 % (ref 5–12)
NEUTROPHILS NFR BLD AUTO: 4.67 10*3/MM3 (ref 1.7–7)
NEUTROPHILS NFR BLD AUTO: 80.9 % (ref 42.7–76)
NRBC BLD AUTO-RTO: 0 /100 WBC (ref 0–0.2)
PLATELET # BLD AUTO: 194 10*3/MM3 (ref 140–450)
PMV BLD AUTO: 10.7 FL (ref 6–12)
POTASSIUM SERPL-SCNC: 4.9 MMOL/L (ref 3.5–5.2)
PROT SERPL-MCNC: 7 G/DL (ref 6–8.5)
RBC # BLD AUTO: 4.7 10*6/MM3 (ref 3.77–5.28)
SODIUM SERPL-SCNC: 142 MMOL/L (ref 136–145)
TRIGL SERPL-MCNC: 53 MG/DL (ref 0–150)
TSH SERPL DL<=0.05 MIU/L-ACNC: 3.18 UIU/ML (ref 0.27–4.2)
VLDLC SERPL-MCNC: 9 MG/DL (ref 5–40)
WBC NRBC COR # BLD: 5.78 10*3/MM3 (ref 3.4–10.8)

## 2023-12-12 ENCOUNTER — TELEPHONE (OUTPATIENT)
Dept: FAMILY MEDICINE CLINIC | Facility: CLINIC | Age: 53
End: 2023-12-12

## 2023-12-12 NOTE — TELEPHONE ENCOUNTER
Caller: Sabiha Hernandez    Relationship to patient: Self    Best call back number: [phone]    Patient is needing: SCHEDULE A MAMMOGRAM

## 2023-12-12 NOTE — TELEPHONE ENCOUNTER
HUB IS INSTRUCTED TO RELAY BELOW MESSAGE     LVM FOR PT TO CALL OFFICE AND LET HER KNOW THAT SHE WOULD HAVE TO CALL WHERE EVER SHE WOULD LIKE IT DONE AT. ASK PT WHERE  AND THE ORDER WILL BE FAXED. THANK YOU.

## 2023-12-28 NOTE — TELEPHONE ENCOUNTER
Caller: Sabiha Hernandez    Relationship: Self    Best call back number: [phone]     What was the call regarding: PATIENT STATED SHE WOULD LIKE TO HAVE THIS MAMMOGRAM DONE AT [facility]     HUB TO READ RELAYED TO PATIENT     PLEASE ADVISE

## 2024-02-07 ENCOUNTER — OFFICE VISIT (OUTPATIENT)
Dept: FAMILY MEDICINE CLINIC | Facility: CLINIC | Age: 54
End: 2024-02-07
Payer: COMMERCIAL

## 2024-02-07 VITALS
BODY MASS INDEX: 23.59 KG/M2 | SYSTOLIC BLOOD PRESSURE: 106 MMHG | WEIGHT: 146.8 LBS | HEIGHT: 66 IN | RESPIRATION RATE: 18 BRPM | TEMPERATURE: 97.8 F | OXYGEN SATURATION: 98 % | DIASTOLIC BLOOD PRESSURE: 67 MMHG | HEART RATE: 58 BPM

## 2024-02-07 DIAGNOSIS — E03.9 HYPOTHYROIDISM, UNSPECIFIED TYPE: ICD-10-CM

## 2024-02-07 PROCEDURE — 99213 OFFICE O/P EST LOW 20 MIN: CPT | Performed by: REGISTERED NURSE

## 2024-02-07 RX ORDER — MULTIPLE VITAMINS W/ MINERALS TAB 9MG-400MCG
2 TAB ORAL DAILY
COMMUNITY

## 2024-02-07 RX ORDER — LEVOTHYROXINE SODIUM 88 MCG
88 TABLET ORAL DAILY
Qty: 90 TABLET | Refills: 1 | Status: SHIPPED | OUTPATIENT
Start: 2024-02-07

## 2024-02-07 NOTE — PROGRESS NOTES
Chief Complaint  Hypothyroidism (Follow up)    Subjective    History of Present Illness {  Problem List  Visit  Diagnosis   Encounters  Notes  Medications  Labs  Result Review Imaging  Media :23}     Sabiha Hernandez presents to [facility] for Hypothyroidism (Follow up).      History of Present Illness  Patient is a 53 y.o. female  presents to the clinic today for 3-month follow-up for hypothyroidism.  Patient denies any chest pain, shortness of breath, or any fevers.  Patient denies any known exposure to COVID, flu, or any other contagious illnesses.    In regards to hypothyroidism, patient is currently taking levothyroxine to manage this.  Patient denies any concerns in regards to her hypothyroidism or her levothyroxine at this time.  Patient's last TSH level in November was 3.18.  Patient denies any significant changes or concerns in regards to her levothyroxine.       Review of Systems   Constitutional: Negative.  Negative for activity change, chills, fatigue and fever.   HENT: Negative.  Negative for congestion, dental problem, ear pain, hearing loss, rhinorrhea, sinus pain, sore throat, tinnitus and trouble swallowing.    Eyes: Negative.  Negative for pain and visual disturbance.   Respiratory: Negative.  Negative for cough, chest tightness, shortness of breath and wheezing.    Cardiovascular: Negative.  Negative for chest pain, palpitations and leg swelling.   Gastrointestinal: Negative.  Negative for abdominal pain, diarrhea, nausea and vomiting.   Endocrine: Negative.  Negative for polydipsia, polyphagia and polyuria.   Genitourinary: Negative.  Negative for difficulty urinating, dysuria, frequency and urgency.   Musculoskeletal: Negative.  Negative for arthralgias, back pain and myalgias.   Skin: Negative.  Negative for color change, pallor, rash and wound.   Allergic/Immunologic: Negative.  Negative for environmental allergies.   Neurological: Negative.  Negative for  "dizziness, speech difficulty, weakness, light-headedness, numbness and headaches.   Hematological: Negative.    Psychiatric/Behavioral: Negative.  Negative for confusion, decreased concentration, self-injury and suicidal ideas. The patient is not nervous/anxious.    All other systems reviewed and are negative.       Objective     Vital Signs:   /67 (BP Location: Left arm, Patient Position: Sitting, Cuff Size: Adult)   Pulse 58   Temp 97.8 °F (36.6 °C) (Oral)   Resp 18   Ht 167.6 cm (66\")   Wt 66.6 kg (146 lb 12.8 oz)   SpO2 98%   BMI 23.69 kg/m²   Current Outpatient Medications on File Prior to Visit   Medication Sig Dispense Refill    Acetaminophen (Tylenol) 325 MG capsule Take 650 mg by mouth As Needed.      multivitamin with minerals tablet tablet Take 2 tablets by mouth Daily. Gummies       No current facility-administered medications on file prior to visit.        Past Medical History:   Diagnosis Date    Anxiety     Breast cancer in female     left breast    Cancer     Depression     Headache     Hypothyroidism       Past Surgical History:   Procedure Laterality Date    BREAST SURGERY Left     HYSTERECTOMY      complete    MASTECTOMY COMPLETE / SIMPLE Left     TOTAL THYROIDECTOMY      TUBAL ABDOMINAL LIGATION        Family History   Problem Relation Age of Onset    Thyroid disease Mother     Hypertension Mother     Stroke Father     Heart disease Father     Diabetes Brother     Cancer Maternal Grandmother     Colon cancer Maternal Grandmother     COPD Maternal Grandfather     Heart disease Paternal Grandmother     Heart disease Paternal Grandfather       Social History     Socioeconomic History    Marital status:    Tobacco Use    Smoking status: Former     Packs/day: 1.00     Years: 31.00     Additional pack years: 0.00     Total pack years: 31.00     Types: Cigarettes     Start date:      Quit date: 2017     Years since quittin.1    Smokeless tobacco: Never   Vaping Use    Vaping " Use: Never used   Substance and Sexual Activity    Alcohol use: Not Currently    Drug use: Never    Sexual activity: Defer         No visits with results within 3 Month(s) from this visit.   Latest known visit with results is:   Clinical Support on 11/06/2023   Component Date Value Ref Range Status    Glucose 11/06/2023 83  65 - 99 mg/dL Final    BUN 11/06/2023 19  6 - 20 mg/dL Final    Creatinine 11/06/2023 1.02 (H)  0.57 - 1.00 mg/dL Final    Sodium 11/06/2023 142  136 - 145 mmol/L Final    Potassium 11/06/2023 4.9  3.5 - 5.2 mmol/L Final    Chloride 11/06/2023 105  98 - 107 mmol/L Final    CO2 11/06/2023 30.0 (H)  22.0 - 29.0 mmol/L Final    Calcium 11/06/2023 9.5  8.6 - 10.5 mg/dL Final    Total Protein 11/06/2023 7.0  6.0 - 8.5 g/dL Final    Albumin 11/06/2023 4.6  3.5 - 5.2 g/dL Final    ALT (SGPT) 11/06/2023 20  1 - 33 U/L Final    AST (SGOT) 11/06/2023 25  1 - 32 U/L Final    Alkaline Phosphatase 11/06/2023 91  39 - 117 U/L Final    Total Bilirubin 11/06/2023 0.3  0.0 - 1.2 mg/dL Final    Globulin 11/06/2023 2.4  gm/dL Final    A/G Ratio 11/06/2023 1.9  g/dL Final    BUN/Creatinine Ratio 11/06/2023 18.6  7.0 - 25.0 Final    Anion Gap 11/06/2023 7.0  5.0 - 15.0 mmol/L Final    eGFR 11/06/2023 66.3  >60.0 mL/min/1.73 Final    Total Cholesterol 11/06/2023 237 (H)  0 - 200 mg/dL Final    Triglycerides 11/06/2023 53  0 - 150 mg/dL Final    HDL Cholesterol 11/06/2023 91 (H)  40 - 60 mg/dL Final    LDL Cholesterol  11/06/2023 137 (H)  0 - 100 mg/dL Final    VLDL Cholesterol 11/06/2023 9  5 - 40 mg/dL Final    LDL/HDL Ratio 11/06/2023 1.49   Final    Hemoglobin A1C 11/06/2023 5.60  4.80 - 5.60 % Final    TSH 11/06/2023 3.180  0.270 - 4.200 uIU/mL Final    WBC 11/06/2023 5.78  3.40 - 10.80 10*3/mm3 Final    RBC 11/06/2023 4.70  3.77 - 5.28 10*6/mm3 Final    Hemoglobin 11/06/2023 13.9  12.0 - 15.9 g/dL Final    Hematocrit 11/06/2023 41.7  34.0 - 46.6 % Final    MCV 11/06/2023 88.7  79.0 - 97.0 fL Final    MCH  11/06/2023 29.6  26.6 - 33.0 pg Final    MCHC 11/06/2023 33.3  31.5 - 35.7 g/dL Final    RDW 11/06/2023 11.9 (L)  12.3 - 15.4 % Final    RDW-SD 11/06/2023 38.6  37.0 - 54.0 fl Final    MPV 11/06/2023 10.7  6.0 - 12.0 fL Final    Platelets 11/06/2023 194  140 - 450 10*3/mm3 Final    Neutrophil % 11/06/2023 80.9 (H)  42.7 - 76.0 % Final    Lymphocyte % 11/06/2023 10.6 (L)  19.6 - 45.3 % Final    Monocyte % 11/06/2023 7.6  5.0 - 12.0 % Final    Eosinophil % 11/06/2023 0.3  0.3 - 6.2 % Final    Basophil % 11/06/2023 0.3  0.0 - 1.5 % Final    Immature Grans % 11/06/2023 0.3  0.0 - 0.5 % Final    Neutrophils, Absolute 11/06/2023 4.67  1.70 - 7.00 10*3/mm3 Final    Lymphocytes, Absolute 11/06/2023 0.61 (L)  0.70 - 3.10 10*3/mm3 Final    Monocytes, Absolute 11/06/2023 0.44  0.10 - 0.90 10*3/mm3 Final    Eosinophils, Absolute 11/06/2023 0.02  0.00 - 0.40 10*3/mm3 Final    Basophils, Absolute 11/06/2023 0.02  0.00 - 0.20 10*3/mm3 Final    Immature Grans, Absolute 11/06/2023 0.02  0.00 - 0.05 10*3/mm3 Final    nRBC 11/06/2023 0.0  0.0 - 0.2 /100 WBC Final         Physical Exam  Vitals and nursing note reviewed.   Constitutional:       Appearance: Normal appearance. She is normal weight.   HENT:      Head: Normocephalic and atraumatic.   Cardiovascular:      Rate and Rhythm: Normal rate and regular rhythm.      Pulses: Normal pulses.      Heart sounds: Normal heart sounds. No murmur heard.     No friction rub. No gallop.   Pulmonary:      Effort: Pulmonary effort is normal. No respiratory distress.      Breath sounds: Normal breath sounds. No stridor. No wheezing, rhonchi or rales.   Chest:      Chest wall: No tenderness.   Abdominal:      General: Abdomen is flat. Bowel sounds are normal. There is no distension.      Palpations: Abdomen is soft. There is no mass.      Tenderness: There is no abdominal tenderness. There is no right CVA tenderness, left CVA tenderness, guarding or rebound.      Hernia: No hernia is present.    Skin:     General: Skin is warm and dry.      Capillary Refill: Capillary refill takes less than 2 seconds.      Coloration: Skin is not jaundiced or pale.   Neurological:      General: No focal deficit present.      Mental Status: She is alert and oriented to person, place, and time. Mental status is at baseline.      Motor: No weakness.      Coordination: Coordination normal.      Gait: Gait normal.   Psychiatric:         Mood and Affect: Mood normal.         Behavior: Behavior normal.         Thought Content: Thought content normal.         Judgment: Judgment normal.          Result Review  Data Reviewed:{ Labs  Result Review  Imaging  Med Tab  Media :23}   I have reviewed this patient's chart.  I have reviewed previous labs, previous imaging, previous medications, and previous encounters with notes that were available in this patient's chart.               Assessment and Plan {CC Problem List  Visit Diagnosis  ROS  Review (Popup)  [facility]  Quality  BestPractice  Medications  SmartSets  SnapShot Encounters  Media :23}   Diagnoses and all orders for this visit:    1. Hypothyroidism, unspecified type  -     Synthroid 88 MCG tablet; Take 1 tablet by mouth Daily.  Dispense: 90 tablet; Refill: 1        -Medication refill sent to pharmacy.  -Defer TSH labs until next appointment.  Come fasting for labs at that time.  -ER red flags discussed with patient including risk versus benefit and education provided.  -Follow-up with me in 3 months or sooner if needed.    I spent 20 minutes caring for Sabiha on this date of service. This time includes time spent by me in the following activities:preparing for the visit, reviewing tests, obtaining and/or reviewing a separately obtained history, performing a medically appropriate examination and/or evaluation , counseling and educating the patient/family/caregiver, ordering medications, tests, or procedures, referring and communicating with other health  care professionals , documenting information in the medical record, independently interpreting results and communicating that information with the patient/family/caregiver, and care coordination.    Follow Up {Instructions Charge Capture  Follow-up Communications :23}     Patient was given instructions and counseling regarding her condition or for health maintenance advice. Please see specific information pulled into the AVS (placed there by myself) if appropriate.    Return in about 3 months (around 5/7/2024).    BMI is within normal parameters. No other follow-up for BMI required.       GWENDOLYN Valdez, FNP-BC

## 2024-02-11 ENCOUNTER — APPOINTMENT (OUTPATIENT)
Dept: GENERAL RADIOLOGY | Facility: HOSPITAL | Age: 54
End: 2024-02-11
Payer: COMMERCIAL

## 2024-02-11 ENCOUNTER — HOSPITAL ENCOUNTER (EMERGENCY)
Facility: HOSPITAL | Age: 54
Discharge: HOME | End: 2024-02-11
Attending: EMERGENCY MEDICINE | Admitting: EMERGENCY MEDICINE
Payer: COMMERCIAL

## 2024-02-11 VITALS
OXYGEN SATURATION: 97 % | SYSTOLIC BLOOD PRESSURE: 121 MMHG | BODY MASS INDEX: 24.41 KG/M2 | DIASTOLIC BLOOD PRESSURE: 70 MMHG | HEART RATE: 67 BPM | HEIGHT: 66 IN | WEIGHT: 151.9 LBS | RESPIRATION RATE: 17 BRPM | TEMPERATURE: 98.7 F

## 2024-02-11 DIAGNOSIS — S92.154A CLOSED NONDISPLACED AVULSION FRACTURE OF RIGHT TALUS, INITIAL ENCOUNTER: ICD-10-CM

## 2024-02-11 DIAGNOSIS — S52.125A CLOSED NONDISPLACED FRACTURE OF HEAD OF LEFT RADIUS, INITIAL ENCOUNTER: Primary | ICD-10-CM

## 2024-02-11 PROCEDURE — 73630 X-RAY EXAM OF FOOT: CPT

## 2024-02-11 PROCEDURE — 73080 X-RAY EXAM OF ELBOW: CPT

## 2024-02-11 PROCEDURE — 73610 X-RAY EXAM OF ANKLE: CPT

## 2024-02-11 PROCEDURE — 96374 THER/PROPH/DIAG INJ IV PUSH: CPT

## 2024-02-11 PROCEDURE — 25010000002 MORPHINE PER 10 MG: Performed by: EMERGENCY MEDICINE

## 2024-02-11 PROCEDURE — 99283 EMERGENCY DEPT VISIT LOW MDM: CPT

## 2024-02-11 RX ORDER — SODIUM CHLORIDE 0.9 % (FLUSH) 0.9 %
10 SYRINGE (ML) INJECTION AS NEEDED
Status: DISCONTINUED | OUTPATIENT
Start: 2024-02-11 | End: 2024-02-12 | Stop reason: HOSPADM

## 2024-02-11 RX ORDER — HYDROCODONE BITARTRATE AND ACETAMINOPHEN 7.5; 325 MG/1; MG/1
1 TABLET ORAL EVERY 4 HOURS PRN
Qty: 10 TABLET | Refills: 0 | Status: SHIPPED | OUTPATIENT
Start: 2024-02-11

## 2024-02-11 RX ADMIN — MORPHINE SULFATE 4 MG: 4 INJECTION, SOLUTION INTRAMUSCULAR; INTRAVENOUS at 21:53

## 2024-02-13 ENCOUNTER — OFFICE VISIT (OUTPATIENT)
Dept: ORTHOPEDIC SURGERY | Facility: CLINIC | Age: 54
End: 2024-02-13
Payer: COMMERCIAL

## 2024-02-13 VITALS — BODY MASS INDEX: 24.27 KG/M2 | OXYGEN SATURATION: 97 % | HEIGHT: 66 IN | HEART RATE: 63 BPM | WEIGHT: 151 LBS

## 2024-02-13 DIAGNOSIS — S52.122A CLOSED DISPLACED FRACTURE OF HEAD OF LEFT RADIUS, INITIAL ENCOUNTER: Primary | ICD-10-CM

## 2024-02-13 DIAGNOSIS — Z12.31 ENCOUNTER FOR SCREENING MAMMOGRAM FOR MALIGNANT NEOPLASM OF BREAST: ICD-10-CM

## 2024-02-14 ENCOUNTER — PATIENT ROUNDING (BHMG ONLY) (OUTPATIENT)
Dept: ORTHOPEDIC SURGERY | Facility: CLINIC | Age: 54
End: 2024-02-14
Payer: COMMERCIAL

## 2024-02-20 ENCOUNTER — OFFICE VISIT (OUTPATIENT)
Dept: PODIATRY | Facility: CLINIC | Age: 54
End: 2024-02-20
Payer: COMMERCIAL

## 2024-02-20 VITALS
HEART RATE: 65 BPM | OXYGEN SATURATION: 97 % | RESPIRATION RATE: 16 BRPM | HEIGHT: 66 IN | WEIGHT: 150 LBS | BODY MASS INDEX: 24.11 KG/M2

## 2024-02-20 DIAGNOSIS — S93.401A SEVERE ANKLE SPRAIN, RIGHT, INITIAL ENCOUNTER: ICD-10-CM

## 2024-02-20 DIAGNOSIS — M25.571 ACUTE RIGHT ANKLE PAIN: Primary | ICD-10-CM

## 2024-02-20 DIAGNOSIS — S92.151A CLOSED DISPLACED AVULSION FRACTURE OF RIGHT TALUS, INITIAL ENCOUNTER: ICD-10-CM

## 2024-02-20 NOTE — PROGRESS NOTES
2024  Foot and Ankle Surgery - New Patient   Provider: SYD FultonM  Location: [facility]    Subjective:  Sabiha Hernandez is a 53 y.o. female.     Chief Complaint   Patient presents with    Right Ankle - Pain     Initial visit fup ER closed non displaced avulsion fx of rt Talus     Right Foot - Pain     Pcp ANJALI SNOW  24       HPI:  Sabiha Hernandez is a 53-year-old female who presents to the office today for evaluation of issues involving her right ankle. She is accompanied by an adult male today.    The patient reports that she slipped and twisted her right ankle on 2024. She states that the intensity and throbbing has improved. She notes that the swelling has significantly improved. The patient reports pain at night. The adult male states that the patient does not like the boot. She is able to tolerate anti-inflammatories.    No Known Allergies    Past Medical History:   Diagnosis Date    Anxiety     Breast cancer in female     left breast    Cancer     Depression     Headache     Hypothyroidism        Past Surgical History:   Procedure Laterality Date    BREAST SURGERY Left     HYSTERECTOMY      complete    MASTECTOMY COMPLETE / SIMPLE Left     TOTAL THYROIDECTOMY      TUBAL ABDOMINAL LIGATION         Family History   Problem Relation Age of Onset    Thyroid disease Mother     Hypertension Mother     Stroke Father     Heart disease Father     Diabetes Brother     Cancer Maternal Grandmother     Colon cancer Maternal Grandmother     COPD Maternal Grandfather     Heart disease Paternal Grandmother     Heart disease Paternal Grandfather        Social History     Socioeconomic History    Marital status:    Tobacco Use    Smoking status: Former     Packs/day: 1.00     Years: 31.00     Additional pack years: 0.00     Total pack years: 31.00     Types: Cigarettes     Start date:      Quit date: 2017     Years since quittin.1    Smokeless tobacco: Never   Vaping Use     "Vaping Use: Never used   Substance and Sexual Activity    Alcohol use: Not Currently    Drug use: Never    Sexual activity: Defer        Current Outpatient Medications on File Prior to Visit   Medication Sig Dispense Refill    Acetaminophen (Tylenol) 325 MG capsule Take 650 mg by mouth As Needed.      HYDROcodone-acetaminophen (NORCO) 7.5-325 MG per tablet Take 1 tablet by mouth Every 4 (Four) Hours As Needed for Moderate Pain. 10 tablet 0    multivitamin with minerals tablet tablet Take 2 tablets by mouth Daily. Gummies      Synthroid 88 MCG tablet Take 1 tablet by mouth Daily. 90 tablet 1     No current facility-administered medications on file prior to visit.       Review of Systems:  General: Denies fever, chills, fatigue, and weakness.  Eyes: Denies vision loss, blurry vision, and excessive redness.  ENT: Denies hearing issues and difficulty swallowing.  Cardiovascular: Denies palpitations, chest pain, or syncopal episodes.  Respiratory: Denies shortness of breath, wheezing, and coughing.  GI: Denies abdominal pain, nausea, and vomiting.   : Denies frequency, hematuria, and urgency.  Musculoskeletal: Denies muscle cramps, joint pains, and stiffness.  Derm: Denies rash, open wounds, or suspicious lesions.  Neuro: Denies headaches, numbness, loss of coordination, and tremors.  Psych: Denies anxiety and depression.  Endocrine: Denies temperature intolerance and changes in appetite.  Heme: Denies bleeding disorders or abnormal bruising.     Objective   Pulse 65   Resp 16   Ht 167.6 cm (66\")   Wt 68 kg (150 lb)   SpO2 97%   BMI 24.21 kg/m²     Foot/Ankle Exam    GENERAL  Orientation:  AAOx3  Affect:  appropriate    VASCULAR     Right Foot Vascularity   Normal vascular exam    Dorsalis pedis:  2+  Posterior tibial:  2+  Skin temperature:  warm  Edema grading:  None  CFT:  < 3 seconds  Pedal hair growth:  Present  Varicosities:  none     Left Foot Vascularity   Normal vascular exam    Dorsalis pedis:  " 2+  Posterior tibial:  2+  Skin temperature:  warm  Edema grading:  None  CFT:  < 3 seconds  Pedal hair growth:  Present  Varicosities:  none     NEUROLOGIC     Right Foot Neurologic   Light touch sensation: normal  Hot/Cold sensation: normal  Achilles reflex:  2+     Left Foot Neurologic   Light touch sensation: normal  Hot/Cold sensation:  normal  Achilles reflex:  2+    MUSCULOSKELETAL     Right Foot Musculoskeletal   Arch:  Normal     Left Foot Musculoskeletal   Arch:  Normal    MUSCLE STRENGTH     Right Foot Muscle Strength   Normal strength    Foot dorsiflexion:  5  Foot plantar flexion:  5  Foot inversion:  5  Foot eversion:  5     Left Foot Muscle Strength   Normal strength    Foot dorsiflexion:  5  Foot plantar flexion:  5  Foot inversion:  5  Foot eversion:  5    DERMATOLOGIC      Right Foot Dermatologic   Skin  Right foot skin is intact.   Nails comment:  Nails 1-5     Left Foot Dermatologic   Skin  Left foot skin is intact.   Nails comment:  Nails 1-5    TESTS     Right Foot Tests   Anterior drawer: negative  Varus tilt: negative     Left Foot Tests   Anterior drawer: negative  Varus tilt: negative     Right foot additional comments: Moderate swelling and ecchymosis involving the right foot and ankle. Discomfort involving along the anterolateral aspect of the foot and ankle. No gross deformity. Range of motion and muscle strength testing is limited secondary to guarding. No proximal fibular pain.      Assessment & Plan   Diagnoses and all orders for this visit:    1. Acute right ankle pain (Primary)    2. Severe ankle sprain, right, initial encounter    3. Closed displaced avulsion fracture of right talus, initial encounter        Patient presents to the office today for evaluation of issues involving her right ankle. Imaging was independently reviewed showing a small fracture. We discussed the etiology and biomechanics involved with her right ankle injury. I explained that it will take approximately 6  weeks for the soft tissues to heal. I recommend rest, ice, compression, elevation, and anti-inflammatories to help with the discomfort. I recommend wearing an ankle sleeve or compression stocking to help with the swelling throughout the day. I advised the patient to remain in the boot for another 2 weeks. She may discontinue use of the crutch as tolerated. I advised the patient to perform range of motion exercises. The patient will return to the office in 2 weeks for reevaluation. Greater than 30 minutes was spent before, during, and after evaluation for patient care.    No orders of the defined types were placed in this encounter.       Note is dictated utilizing voice recognition software. Unfortunately this leads to occasional typographical errors. I apologize in advance if the situation occurs. If questions occur please do not hesitate to call our office.    Transcribed from ambient dictation for BARBARA David DPM by Rachael Foote.  02/20/24   10:44 EST    Patient or patient representative verbalized consent to the visit recording.  I have personally performed the services described in this document as transcribed by the above individual, and it is both accurate and complete.

## 2024-02-21 ENCOUNTER — PATIENT ROUNDING (BHMG ONLY) (OUTPATIENT)
Dept: ORTHOPEDIC SURGERY | Facility: CLINIC | Age: 54
End: 2024-02-21
Payer: COMMERCIAL

## 2024-02-21 ENCOUNTER — TELEPHONE (OUTPATIENT)
Dept: FAMILY MEDICINE CLINIC | Facility: CLINIC | Age: 54
End: 2024-02-21
Payer: COMMERCIAL

## 2024-02-21 NOTE — PROGRESS NOTES
A my chart message has been sent to the patient for PATIENT ROUNDING with [facility]

## 2024-02-21 NOTE — TELEPHONE ENCOUNTER
Hub is instructed to read the documentation below to patient  --  Can you please inform patient of the following results:    Per radiologist, mammogram shows no signs of malignancy present.  Routine mammogram recommended.    Please call or message with any questions or concerns.    Rickey Jamil, APRN

## 2024-02-27 ENCOUNTER — OFFICE VISIT (OUTPATIENT)
Dept: ORTHOPEDIC SURGERY | Facility: CLINIC | Age: 54
End: 2024-02-27
Payer: COMMERCIAL

## 2024-02-27 VITALS — HEART RATE: 64 BPM | HEIGHT: 66 IN | WEIGHT: 150 LBS | BODY MASS INDEX: 24.11 KG/M2 | OXYGEN SATURATION: 97 %

## 2024-02-27 DIAGNOSIS — S52.122A CLOSED DISPLACED FRACTURE OF HEAD OF LEFT RADIUS, INITIAL ENCOUNTER: Primary | ICD-10-CM

## 2024-02-27 NOTE — PROGRESS NOTES
Primary Care Sports Medicine Office Visit Note     Patient ID: Sabiha Hernandez is a 53 y.o. female.    Chief Complaint:  Chief Complaint   Patient presents with    Left Elbow - Follow-up, Pain     DOI: 24 Fell      HPI:    Ms. Sabiha Hernandez is a 53 y.o. female. The patient presents to the clinic today for follow-up evaluation of closed nondisplaced fracture of the head of the left radius. Initial injury date was 2024.    She has noticed an improvement in her motion and pain. She is able to fully rotate her left upper extremity at this time and notes pressure with this motion. She is still unable to fully extend. She is able to wash her hair currently. Still experiencing some soreness; however, she is happy with her progression.    Past Medical History:   Diagnosis Date    Anxiety     Breast cancer in female     left breast    Cancer     Depression     Headache     Hypothyroidism        Past Surgical History:   Procedure Laterality Date    BREAST SURGERY Left     HYSTERECTOMY      complete    MASTECTOMY COMPLETE / SIMPLE Left     TOTAL THYROIDECTOMY      TUBAL ABDOMINAL LIGATION         Family History   Problem Relation Age of Onset    Thyroid disease Mother     Hypertension Mother     Stroke Father     Heart disease Father     Diabetes Brother     Cancer Maternal Grandmother     Colon cancer Maternal Grandmother     COPD Maternal Grandfather     Heart disease Paternal Grandmother     Heart disease Paternal Grandfather      Social History     Occupational History    Not on file   Tobacco Use    Smoking status: Former     Packs/day: 1.00     Years: 31.00     Additional pack years: 0.00     Total pack years: 31.00     Types: Cigarettes     Start date:      Quit date: 2017     Years since quittin.1     Passive exposure: Past    Smokeless tobacco: Never   Vaping Use    Vaping Use: Never used   Substance and Sexual Activity    Alcohol use: Not Currently    Drug use: Never    Sexual activity: Defer      Review  "of Systems   Constitutional:  Negative for activity change and fever.   Musculoskeletal:  Positive for arthralgias.   Skin:  Negative for color change and rash.   Neurological:  Negative for weakness.     Objective:    Pulse 64   Ht 167.6 cm (66\")   Wt 68 kg (150 lb)   SpO2 97%   BMI 24.21 kg/m²     Physical Examination:  Physical Exam  Vitals and nursing note reviewed.   Constitutional:       General: She is not in acute distress.     Appearance: She is well-developed. She is not diaphoretic.   HENT:      Head: Normocephalic and atraumatic.   Eyes:      Conjunctiva/sclera: Conjunctivae normal.   Pulmonary:      Effort: Pulmonary effort is normal. No respiratory distress.   Skin:     General: Skin is warm.      Capillary Refill: Capillary refill takes less than 2 seconds.   Neurological:      Mental Status: She is alert.       Left Elbow Exam     Tenderness   The patient is experiencing no tenderness (There is no tenderness to palpation of the radial head, lateral epicondyle, medial epicondyle, olecranon, or other bony prominences.).     Range of Motion   Extension:  10 abnormal   Flexion:  120 normal   Pronation:  normal   Supination:  90 normal     Muscle Strength   Pronation:  5/5   Supination:  5/5     Comments:  Strength on flexion and extension- 5/5        Imaging and other tests:  2 view XR of the left elbow dated 2/11/2024 reveals nondisplaced radial head neck fracture.    Assessment and Plan:    1. Closed displaced fracture of head of left radius, initial encounter    I discussed pathology and treatment options with the patient today. She is doing well, but unfortunately still does have a mild lack of extension. This appears to be mild joint contracture as on physical exam with pain, she could get to zero. I recommend we start physical therapy for stretching, strengthening, and simple range of motion. She will continue shoulder immobilizer sling. Return to clinic in 2 weeks for repeat " imaging.    Transcribed from ambient dictation for Miguel Hoffman II,  by Gila Pimentel.  02/27/24   15:04 EST    Patient or patient representative verbalized consent to the visit recording.  I have personally performed the services described in this document as transcribed by the above individual, and it is both accurate and complete.    Disclaimer: Please note that areas of this note were completed with computer voice recognition software.  Quite often unanticipated grammatical, syntax, homophones, and other interpretive errors are inadvertently transcribed by the computer software. Please excuse any errors that have escaped final proofreading.

## 2024-02-29 ENCOUNTER — TELEPHONE (OUTPATIENT)
Dept: ORTHOPEDIC SURGERY | Facility: CLINIC | Age: 54
End: 2024-02-29
Payer: COMMERCIAL

## 2024-02-29 NOTE — TELEPHONE ENCOUNTER
Caller: Sabiha Hernandez    Relationship: Self    Best call back number: [phone]    What orders are you requesting (i.e. lab or imaging): LEFT ELBOW -  PHYSICAL THERAPY    In what timeframe would the patient need to come in: ASAP     Where will you receive your lab/imaging services: PATIENT STATES DR VILLAGOMEZ MENTIONED SOMEONE [facility] WHERE SHE LIVES BUT SHE DOES NOT RECALL THE NAME. SHE WOULD LIKE THE ORDER FAXED TO THIS FACILITY SO SHE CAN SCHEDULE HER APPT. PLEASE CALL THE PATIENT WHEN THIS HAS FRANKLIN FAXED. OKAY TO LEAVE A VOICEMAIL.

## 2024-02-29 NOTE — TELEPHONE ENCOUNTER
Left VM letting patient know I am just waiting on OV to be finished by Dr. Hoffman and I will be sending Order to [facility] in [place]. I gave patient the telephone number to call and check on order later

## 2024-03-04 NOTE — TELEPHONE ENCOUNTER
Caller: Sabiha Hernandez    Relationship to patient: SELF    Best call back number:     Patient is needing: MS KELSI WOULD LIKE THE P/T ORDER TO [facility]

## 2024-03-05 ENCOUNTER — TELEPHONE (OUTPATIENT)
Dept: ORTHOPEDIC SURGERY | Facility: CLINIC | Age: 54
End: 2024-03-05
Payer: COMMERCIAL

## 2024-03-12 ENCOUNTER — OFFICE VISIT (OUTPATIENT)
Dept: ORTHOPEDIC SURGERY | Facility: CLINIC | Age: 54
End: 2024-03-12
Payer: COMMERCIAL

## 2024-03-12 VITALS — HEART RATE: 66 BPM | HEIGHT: 66 IN | OXYGEN SATURATION: 97 % | WEIGHT: 150 LBS | BODY MASS INDEX: 24.11 KG/M2

## 2024-03-12 DIAGNOSIS — S52.122A CLOSED DISPLACED FRACTURE OF HEAD OF LEFT RADIUS, INITIAL ENCOUNTER: Primary | ICD-10-CM

## 2024-03-12 PROCEDURE — 99213 OFFICE O/P EST LOW 20 MIN: CPT | Performed by: FAMILY MEDICINE

## 2024-03-12 NOTE — PROGRESS NOTES
Primary Care Sports Medicine Office Visit Note     Patient ID: Sabiha Hernandez is a 53 y.o. female.    Chief Complaint:  Chief Complaint   Patient presents with    Left Elbow - Pain, Follow-up     DOI: 24 Fell     HPI:    Ms. Sabiha Hernandez is a 53 y.o. female. The patient presents to the clinic today for follow-up evaluation of closed nondisplaced fracture of the head of the left radius, 2-week follow-up. Date of initial injury is 2024.    The patient reports that her left elbow is significantly improving. She is moving okay. She has been doing the 3-pound weight. She is 4 weeks out from her injury. She has already set up the physical therapy in [place]. She had a consultation last week (the week of 2024) and was given exercises to do at home.    Past Medical History:   Diagnosis Date    Anxiety     Breast cancer in female     left breast    Cancer     Depression     Headache     Hypothyroidism        Past Surgical History:   Procedure Laterality Date    BREAST SURGERY Left     HYSTERECTOMY      complete    MASTECTOMY COMPLETE / SIMPLE Left     TOTAL THYROIDECTOMY      TUBAL ABDOMINAL LIGATION         Family History   Problem Relation Age of Onset    Thyroid disease Mother     Hypertension Mother     Stroke Father     Heart disease Father     Diabetes Brother     Cancer Maternal Grandmother     Colon cancer Maternal Grandmother     COPD Maternal Grandfather     Heart disease Paternal Grandmother     Heart disease Paternal Grandfather      Social History     Occupational History    Not on file   Tobacco Use    Smoking status: Former     Current packs/day: 0.00     Average packs/day: 1 pack/day for 31.0 years (31.0 ttl pk-yrs)     Types: Cigarettes     Start date:      Quit date: 2017     Years since quittin.1     Passive exposure: Past    Smokeless tobacco: Never   Vaping Use    Vaping status: Never Used   Substance and Sexual Activity    Alcohol use: Not Currently    Drug use: Never    Sexual  "activity: Defer      Review of Systems   Constitutional:  Negative for activity change and fever.   Musculoskeletal:  Positive for arthralgias.   Skin:  Negative for color change and rash.   Neurological:  Negative for weakness.     Objective:    Pulse 66   Ht 167.6 cm (66\")   Wt 68 kg (150 lb)   SpO2 97%   BMI 24.21 kg/m²     Physical Examination:  Physical Exam  Vitals and nursing note reviewed.   Constitutional:       General: She is not in acute distress.     Appearance: She is well-developed. She is not diaphoretic.   HENT:      Head: Normocephalic and atraumatic.   Eyes:      Conjunctiva/sclera: Conjunctivae normal.   Pulmonary:      Effort: Pulmonary effort is normal. No respiratory distress.   Skin:     General: Skin is warm.      Capillary Refill: Capillary refill takes less than 2 seconds.   Neurological:      Mental Status: She is alert.       Left Elbow Exam     Comments:  Left elbow examination reveals near full range of motion to only about a lack of the terminal 2 to 3 degrees of extension. Otherwise, pronation and supination are full. Strength to pronation, supination of the wrist, flexion, and extension of the elbow are 5/5. Distal digits are neurovascularly intact.          Imaging and other tests:  Three-view x-ray of the left elbow today, 03/12/2024, shows mild hazy callus formation of the entirety of fracture line without obvious change in alignment.    Assessment and Plan:    1. Closed displaced fracture of head of left radius, subsequent encounter, with routine healing  - XR Elbow 2 View Left    I discussed pathology with the patient today, 03/12/2024. She is doing incredibly well and has started physical therapy for improvement in very small lack of extension range of motion. Continue physical therapy. Return to clinic in 4 weeks for repeat imaging and hopeful discontinuation of immobilization at that time.    Transcribed from ambient dictation for Miguel Hoffman II, DO by Meka " Boni.  03/12/24   11:41 EDT    Patient or patient representative verbalized consent to the visit recording.  I have personally performed the services described in this document as transcribed by the above individual, and it is both accurate and complete.      Disclaimer: Please note that areas of this note were completed with computer voice recognition software.  Quite often unanticipated grammatical, syntax, homophones, and other interpretive errors are inadvertently transcribed by the computer software. Please excuse any errors that have escaped final proofreading.

## 2024-04-09 ENCOUNTER — OFFICE VISIT (OUTPATIENT)
Dept: ORTHOPEDIC SURGERY | Facility: CLINIC | Age: 54
End: 2024-04-09
Payer: COMMERCIAL

## 2024-04-09 VITALS — HEART RATE: 61 BPM | BODY MASS INDEX: 24.11 KG/M2 | WEIGHT: 150 LBS | HEIGHT: 66 IN | OXYGEN SATURATION: 98 %

## 2024-04-09 DIAGNOSIS — S52.122D CLOSED DISPLACED FRACTURE OF HEAD OF LEFT RADIUS WITH ROUTINE HEALING, SUBSEQUENT ENCOUNTER: Primary | ICD-10-CM

## 2024-04-09 PROCEDURE — 99213 OFFICE O/P EST LOW 20 MIN: CPT | Performed by: STUDENT IN AN ORGANIZED HEALTH CARE EDUCATION/TRAINING PROGRAM

## 2024-04-09 NOTE — PROGRESS NOTES
FOLLOW UP VISIT    Patient: Sabiha Hernandez  ?  YOB: 1970    MRN: [id number]  ?  Chief Complaint   Patient presents with    Left Elbow - Follow-up      ?  HPI: New patient to me.  Patient presenting for 8-week follow-up of left head fracture.  Previously followed by my colleague Dr. Hoffman.  Asymptomatic with daily activity.  Mild discomfort with heavy lifting utilizing left upper extremity although she has been favoring more right upper extremity at this time.  Elbow not currently limiting any activities.  Had completed formal course of physical therapy without increase in symptoms.  Denies new injury.    Allergies: No Known Allergies    Past Medical History:   Diagnosis Date    Anxiety     Breast cancer in female     left breast    Cancer     Depression     Headache     Hypothyroidism      Past Surgical History:   Procedure Laterality Date    BREAST SURGERY Left     HYSTERECTOMY      complete    MASTECTOMY COMPLETE / SIMPLE Left     TOTAL THYROIDECTOMY      TUBAL ABDOMINAL LIGATION       Social History     Occupational History    Not on file   Tobacco Use    Smoking status: Former     Current packs/day: 0.00     Average packs/day: 1 pack/day for 31.0 years (31.0 ttl pk-yrs)     Types: Cigarettes     Start date:      Quit date: 2017     Years since quittin.2     Passive exposure: Past    Smokeless tobacco: Never   Vaping Use    Vaping status: Never Used   Substance and Sexual Activity    Alcohol use: Not Currently    Drug use: Never    Sexual activity: Defer      Social History     Social History Narrative    Not on file     Family History   Problem Relation Age of Onset    Thyroid disease Mother     Hypertension Mother     Stroke Father     Heart disease Father     Diabetes Brother     Cancer Maternal Grandmother     Colon cancer Maternal Grandmother     COPD Maternal Grandfather     Heart disease Paternal Grandmother     Heart disease Paternal Grandfather        Review of  "Systems  Constitutional: Negative.  Negative for fever.   Musculoskeletal: Positive for joint pain  Skin: Negative.  Negative for rash and wound.    Neurological: Negative for numbness.     Vitals:    04/09/24 1237   Pulse: 61   SpO2: 98%   Weight: 68 kg (150 lb)   Height: 167.6 cm (66\")        Physical Exam  Constitutional: Patient is oriented to person, place, and time. Appears well-developed and well-nourished.   Head: Normocephalic and atraumatic.   Pulmonary/Chest: Effort normal.   Musculoskeletal:   See detailed exam below   Neurological: Alert and oriented to person, place, and time. No sensory deficit. Coordination normal.   Skin: Skin is warm and dry. Capillary refill takes less than 2 seconds. No rash noted. No erythema.     The left elbow is without obvious signs of acute bony deformity, swelling, erythema or ecchymosis. There is no tenderness to the medial or lateral joint line.  Minimal tenderness over radial head to full supination.  Posterior elbow is nontender. Soft tissues including the biceps tendon, flexor/pronator mass, common extensor tendon, and UCL are nontender. Active and passive range of motion at the elbow are full, symmetrical and pain-free. Elbow flexion and extension strength are 5/5 and equal to the opposite arm. Supination and pronation are are normal and painless. Wrist range of motion is normal and painless. Wrist/forearm strength is 5/5 and painless. Sensory and vascular exams are otherwise normal.   The opposite arm is normal. Gait is pain-free and tandem.    Diagnostics:  xrays obtained today     Left Elbow X-Ray  Indication: Fracture follow-up  Views: AP and Lateral views    Findings:  No change in alignment of prior intra-articular radial head fracture, with signs of interval healing and less visualized fracture site      XR Elbow 2 View Left    Result Date: 3/12/2024  Early healing of previously noted vertical intra-articular radial head fracture, with unchanged alignment from " previous.     XR Elbow 3+ View Left    Result Date: 2/11/2024  Impression: Limited study demonstrates elbow joint effusion with possible nondisplaced radial head and neck fracture. Consider reimaging with standard views if the patient is able or short-term follow-up when the patient is able. Electronically Signed: Ralph Mendes MD  2/11/2024 10:11 PM EST  Workstation ID: [id number]       Assessment:  Diagnoses and all orders for this visit:    1. Closed displaced fracture of head of left radius with routine healing, subsequent encounter (Primary)  -     XR Elbow 2 View Left      ?    Plan    Patient returning for follow-up of left radial head fracture.  Had been following with my colleague Dr. Hoffman.  She is currently 8 weeks from initial injury.  Completed formal physical therapy, currently clinically asymptomatic except for heavy lifting with mild discomfort over radial head.  Repeat x-rays today with continued alignment, and continued bony healing.  Will allow her to return to full activity as tolerated.  Follow-up with Dr. Hoffman as needed  Follow up as needed    Date of encounter: 04/09/2024   Tashi Willoughby DO    Electronically signed by Tashi Willoughby DO, 04/09/24, 12:55 PM EDT.    Disclaimer: Please note that areas of this note were completed with computer voice recognition software.  Quite often unanticipated grammatical, syntax, homophones, and other interpretive errors are inadvertently transcribed by the computer software. Please excuse any errors that have escaped final proofreading.

## 2024-05-08 ENCOUNTER — OFFICE VISIT (OUTPATIENT)
Dept: FAMILY MEDICINE CLINIC | Facility: CLINIC | Age: 54
End: 2024-05-08
Payer: COMMERCIAL

## 2024-05-08 VITALS
HEIGHT: 66 IN | OXYGEN SATURATION: 97 % | WEIGHT: 152.4 LBS | HEART RATE: 78 BPM | BODY MASS INDEX: 24.49 KG/M2 | SYSTOLIC BLOOD PRESSURE: 103 MMHG | DIASTOLIC BLOOD PRESSURE: 67 MMHG | TEMPERATURE: 98.9 F

## 2024-05-08 DIAGNOSIS — Z13.29 SCREENING FOR ENDOCRINE, METABOLIC AND IMMUNITY DISORDER: Primary | ICD-10-CM

## 2024-05-08 DIAGNOSIS — E03.9 HYPOTHYROIDISM, UNSPECIFIED TYPE: ICD-10-CM

## 2024-05-08 DIAGNOSIS — Z13.228 SCREENING FOR ENDOCRINE, METABOLIC AND IMMUNITY DISORDER: Primary | ICD-10-CM

## 2024-05-08 DIAGNOSIS — Z13.0 SCREENING FOR ENDOCRINE, METABOLIC AND IMMUNITY DISORDER: Primary | ICD-10-CM

## 2024-05-08 DIAGNOSIS — Z13.1 SCREENING FOR DIABETES MELLITUS: ICD-10-CM

## 2024-05-08 DIAGNOSIS — E78.2 MIXED HYPERLIPIDEMIA: ICD-10-CM

## 2024-05-08 DIAGNOSIS — Z13.220 SCREENING FOR LIPID DISORDERS: ICD-10-CM

## 2024-05-08 PROCEDURE — 99214 OFFICE O/P EST MOD 30 MIN: CPT | Performed by: REGISTERED NURSE

## 2024-05-08 RX ORDER — LEVOTHYROXINE SODIUM 88 MCG
88 TABLET ORAL DAILY
Qty: 90 TABLET | Refills: 1 | Status: SHIPPED | OUTPATIENT
Start: 2024-05-08

## 2024-05-20 ENCOUNTER — CLINICAL SUPPORT (OUTPATIENT)
Dept: FAMILY MEDICINE CLINIC | Facility: CLINIC | Age: 54
End: 2024-05-20
Payer: COMMERCIAL

## 2024-05-20 DIAGNOSIS — Z13.0 SCREENING FOR ENDOCRINE, METABOLIC AND IMMUNITY DISORDER: ICD-10-CM

## 2024-05-20 DIAGNOSIS — E78.2 MIXED HYPERLIPIDEMIA: ICD-10-CM

## 2024-05-20 DIAGNOSIS — Z13.1 SCREENING FOR DIABETES MELLITUS: ICD-10-CM

## 2024-05-20 DIAGNOSIS — E03.9 HYPOTHYROIDISM, UNSPECIFIED TYPE: ICD-10-CM

## 2024-05-20 DIAGNOSIS — Z13.228 SCREENING FOR ENDOCRINE, METABOLIC AND IMMUNITY DISORDER: ICD-10-CM

## 2024-05-20 DIAGNOSIS — Z13.29 SCREENING FOR ENDOCRINE, METABOLIC AND IMMUNITY DISORDER: ICD-10-CM

## 2024-05-20 PROCEDURE — 83036 HEMOGLOBIN GLYCOSYLATED A1C: CPT | Performed by: REGISTERED NURSE

## 2024-05-20 PROCEDURE — 80053 COMPREHEN METABOLIC PANEL: CPT | Performed by: REGISTERED NURSE

## 2024-05-20 PROCEDURE — 80061 LIPID PANEL: CPT | Performed by: REGISTERED NURSE

## 2024-05-20 PROCEDURE — 36415 COLL VENOUS BLD VENIPUNCTURE: CPT | Performed by: REGISTERED NURSE

## 2024-05-20 PROCEDURE — 85025 COMPLETE CBC W/AUTO DIFF WBC: CPT | Performed by: REGISTERED NURSE

## 2024-05-20 PROCEDURE — 84443 ASSAY THYROID STIM HORMONE: CPT | Performed by: REGISTERED NURSE

## 2024-05-20 NOTE — PROGRESS NOTES
Venipuncture Blood Specimen Collection  Venipuncture performed in rt arm via venipuncture by Casimiro Leigh with good hemostasis. Patient tolerated the procedure well without complications.   05/20/24   Casimiro Leigh

## 2024-05-21 LAB
ALBUMIN SERPL-MCNC: 4 G/DL (ref 3.5–5.2)
ALBUMIN/GLOB SERPL: 1.5 G/DL
ALP SERPL-CCNC: 76 U/L (ref 39–117)
ALT SERPL W P-5'-P-CCNC: 17 U/L (ref 1–33)
ANION GAP SERPL CALCULATED.3IONS-SCNC: 10 MMOL/L (ref 5–15)
AST SERPL-CCNC: 21 U/L (ref 1–32)
BASOPHILS # BLD AUTO: 0.02 10*3/MM3 (ref 0–0.2)
BASOPHILS NFR BLD AUTO: 0.6 % (ref 0–1.5)
BILIRUB SERPL-MCNC: 0.4 MG/DL (ref 0–1.2)
BUN SERPL-MCNC: 17 MG/DL (ref 6–20)
BUN/CREAT SERPL: 18.7 (ref 7–25)
CALCIUM SPEC-SCNC: 8.8 MG/DL (ref 8.6–10.5)
CHLORIDE SERPL-SCNC: 105 MMOL/L (ref 98–107)
CHOLEST SERPL-MCNC: 238 MG/DL (ref 0–200)
CO2 SERPL-SCNC: 26 MMOL/L (ref 22–29)
CREAT SERPL-MCNC: 0.91 MG/DL (ref 0.57–1)
DEPRECATED RDW RBC AUTO: 39.6 FL (ref 37–54)
EGFRCR SERPLBLD CKD-EPI 2021: 75.6 ML/MIN/1.73
EOSINOPHIL # BLD AUTO: 0.06 10*3/MM3 (ref 0–0.4)
EOSINOPHIL NFR BLD AUTO: 1.9 % (ref 0.3–6.2)
ERYTHROCYTE [DISTWIDTH] IN BLOOD BY AUTOMATED COUNT: 12.3 % (ref 12.3–15.4)
GLOBULIN UR ELPH-MCNC: 2.6 GM/DL
GLUCOSE SERPL-MCNC: 87 MG/DL (ref 65–99)
HBA1C MFR BLD: 5.6 % (ref 4.8–5.6)
HCT VFR BLD AUTO: 40.5 % (ref 34–46.6)
HDLC SERPL-MCNC: 77 MG/DL (ref 40–60)
HGB BLD-MCNC: 13.3 G/DL (ref 12–15.9)
IMM GRANULOCYTES # BLD AUTO: 0.01 10*3/MM3 (ref 0–0.05)
IMM GRANULOCYTES NFR BLD AUTO: 0.3 % (ref 0–0.5)
LDLC SERPL CALC-MCNC: 153 MG/DL (ref 0–100)
LDLC/HDLC SERPL: 1.96 {RATIO}
LYMPHOCYTES # BLD AUTO: 1.09 10*3/MM3 (ref 0.7–3.1)
LYMPHOCYTES NFR BLD AUTO: 34.9 % (ref 19.6–45.3)
MCH RBC QN AUTO: 29.4 PG (ref 26.6–33)
MCHC RBC AUTO-ENTMCNC: 32.8 G/DL (ref 31.5–35.7)
MCV RBC AUTO: 89.6 FL (ref 79–97)
MONOCYTES # BLD AUTO: 0.24 10*3/MM3 (ref 0.1–0.9)
MONOCYTES NFR BLD AUTO: 7.7 % (ref 5–12)
NEUTROPHILS NFR BLD AUTO: 1.7 10*3/MM3 (ref 1.7–7)
NEUTROPHILS NFR BLD AUTO: 54.6 % (ref 42.7–76)
NRBC BLD AUTO-RTO: 0 /100 WBC (ref 0–0.2)
PLATELET # BLD AUTO: 199 10*3/MM3 (ref 140–450)
PMV BLD AUTO: 10.4 FL (ref 6–12)
POTASSIUM SERPL-SCNC: 4.2 MMOL/L (ref 3.5–5.2)
PROT SERPL-MCNC: 6.6 G/DL (ref 6–8.5)
RBC # BLD AUTO: 4.52 10*6/MM3 (ref 3.77–5.28)
SODIUM SERPL-SCNC: 141 MMOL/L (ref 136–145)
TRIGL SERPL-MCNC: 50 MG/DL (ref 0–150)
TSH SERPL DL<=0.05 MIU/L-ACNC: 4.57 UIU/ML (ref 0.27–4.2)
VLDLC SERPL-MCNC: 8 MG/DL (ref 5–40)
WBC NRBC COR # BLD AUTO: 3.12 10*3/MM3 (ref 3.4–10.8)

## 2024-06-03 ENCOUNTER — PATIENT MESSAGE (OUTPATIENT)
Dept: FAMILY MEDICINE CLINIC | Facility: CLINIC | Age: 54
End: 2024-06-03
Payer: COMMERCIAL

## 2024-06-10 DIAGNOSIS — E03.9 HYPOTHYROIDISM, UNSPECIFIED TYPE: ICD-10-CM

## 2024-06-10 NOTE — TELEPHONE ENCOUNTER
PATIENT CALLING IN REGARDS TO HER THYROID MEDICATION, SHE SAID THAT HER LAST LABS HER LEVELS WERE OFF AND SHE IS FEELING FATIGUED A LOT, SHE IS WANTING TO KNOW IF HER THYROID COULD BE INCREASED TO  MCG, IF THIS WOULD HELP WITH HER FATIGUE?                   Rx Refill Note  Requested Prescriptions     Pending Prescriptions Disp Refills    Synthroid 88 MCG tablet 90 tablet 1     Sig: Take 1 tablet by mouth Daily.      Last office visit with prescribing clinician: 5/8/2024   Last telemedicine visit with prescribing clinician: Visit date not found   Next office visit with prescribing clinician: Visit date not found           Pavithra Venegas CMA  06/10/24, 16:30 EDT

## 2024-06-11 RX ORDER — LEVOTHYROXINE SODIUM 88 MCG
88 TABLET ORAL DAILY
Qty: 90 TABLET | Refills: 1 | Status: SHIPPED | OUTPATIENT
Start: 2024-06-11

## 2025-03-02 DIAGNOSIS — E03.9 HYPOTHYROIDISM, UNSPECIFIED TYPE: ICD-10-CM

## 2025-03-03 RX ORDER — LEVOTHYROXINE SODIUM 88 MCG
88 TABLET ORAL DAILY
Qty: 90 TABLET | Refills: 1 | OUTPATIENT
Start: 2025-03-03

## 2025-07-09 ENCOUNTER — TELEPHONE (OUTPATIENT)
Dept: FAMILY MEDICINE CLINIC | Facility: CLINIC | Age: 55
End: 2025-07-09
Payer: COMMERCIAL

## 2025-07-09 NOTE — TELEPHONE ENCOUNTER
Caller: HernandezSabiha    Relationship: Self    Best call back number:     [phone]       What medication are you requesting: VALCYCLOVIR    What are your current symptoms:     How long have you been experiencing symptoms:     Have you had these symptoms before:    [] Yes  [] No    Have you been treated for these symptoms before:   [] Yes  [] No    If a prescription is needed, what is your preferred pharmacy and phone number: [facility] #[id number] - [address] AT [place] [address] - [phone] [facility] [phone]      Additional notes:PATIENT IS HAVING HER LIPS BLUSHED ON 7/30/25 AND SHE WAS TOLD THAT SHE NEEDS TO TAKE VALACYCLOVIR 3 DAYS BEFORE AND 3 DAYS AFTER SHE GETS IT DONE.

## 2025-07-10 ENCOUNTER — TELEPHONE (OUTPATIENT)
Dept: FAMILY MEDICINE CLINIC | Facility: CLINIC | Age: 55
End: 2025-07-10
Payer: COMMERCIAL

## 2025-07-10 NOTE — TELEPHONE ENCOUNTER
COLBY to relay description below.      LVM FOR PT TO CALL OFFICE LET PT KNOW THAT SHE IS SCHEDULE FOR 07-16-25 AT 11AM INSTEAD OF 1145AM  ASK PT WHAT HOSPITAL WAS SHE IN THANK YOU

## 2025-07-14 DIAGNOSIS — E03.9 HYPOTHYROIDISM, UNSPECIFIED TYPE: ICD-10-CM

## 2025-07-14 RX ORDER — LEVOTHYROXINE SODIUM 100 MCG
100 TABLET ORAL DAILY
Qty: 30 TABLET | Refills: 2 | OUTPATIENT
Start: 2025-07-14

## 2025-07-14 NOTE — TELEPHONE ENCOUNTER
Rx Refill Note  Requested Prescriptions     Pending Prescriptions Disp Refills    Synthroid 100 MCG tablet 30 tablet 2     Sig: Take 1 tablet by mouth Daily.      Last office visit with prescribing clinician: 5/8/2024   Last telemedicine visit with prescribing clinician: Visit date not found   Next office visit with prescribing clinician: 7/16/2025                         Would you like a call back once the refill request has been completed: [] Yes [] No    If the office needs to give you a call back, can they leave a voicemail: [] Yes [] No    Francia Potts MA  07/14/25, 13:01 EDT  
There is a refill ready at pharmacy that we sent in on 06/10/25 that has not been picked up by patient.  She has a appointment in 2 Days Here on 07/16/25  
David monson

## 2025-07-16 ENCOUNTER — OFFICE VISIT (OUTPATIENT)
Dept: FAMILY MEDICINE CLINIC | Facility: CLINIC | Age: 55
End: 2025-07-16
Payer: COMMERCIAL

## 2025-07-16 VITALS
SYSTOLIC BLOOD PRESSURE: 111 MMHG | TEMPERATURE: 97.8 F | BODY MASS INDEX: 25.13 KG/M2 | HEIGHT: 66 IN | WEIGHT: 156.4 LBS | RESPIRATION RATE: 18 BRPM | HEART RATE: 60 BPM | OXYGEN SATURATION: 99 % | DIASTOLIC BLOOD PRESSURE: 81 MMHG

## 2025-07-16 DIAGNOSIS — Z13.0 SCREENING FOR ENDOCRINE, METABOLIC AND IMMUNITY DISORDER: ICD-10-CM

## 2025-07-16 DIAGNOSIS — Z13.1 SCREENING FOR DIABETES MELLITUS: ICD-10-CM

## 2025-07-16 DIAGNOSIS — B00.9 HSV-1 (HERPES SIMPLEX VIRUS 1) INFECTION: ICD-10-CM

## 2025-07-16 DIAGNOSIS — Z11.59 NEED FOR HEPATITIS C SCREENING TEST: ICD-10-CM

## 2025-07-16 DIAGNOSIS — E78.2 MIXED HYPERLIPIDEMIA: ICD-10-CM

## 2025-07-16 DIAGNOSIS — Z13.228 SCREENING FOR ENDOCRINE, METABOLIC AND IMMUNITY DISORDER: ICD-10-CM

## 2025-07-16 DIAGNOSIS — E03.9 HYPOTHYROIDISM, UNSPECIFIED TYPE: Primary | ICD-10-CM

## 2025-07-16 DIAGNOSIS — Z13.29 SCREENING FOR ENDOCRINE, METABOLIC AND IMMUNITY DISORDER: ICD-10-CM

## 2025-07-16 DIAGNOSIS — M79.672 ACUTE FOOT PAIN, LEFT: ICD-10-CM

## 2025-07-16 PROCEDURE — 83036 HEMOGLOBIN GLYCOSYLATED A1C: CPT | Performed by: REGISTERED NURSE

## 2025-07-16 PROCEDURE — 86803 HEPATITIS C AB TEST: CPT | Performed by: REGISTERED NURSE

## 2025-07-16 PROCEDURE — 85025 COMPLETE CBC W/AUTO DIFF WBC: CPT | Performed by: REGISTERED NURSE

## 2025-07-16 PROCEDURE — 84443 ASSAY THYROID STIM HORMONE: CPT | Performed by: REGISTERED NURSE

## 2025-07-16 PROCEDURE — 80053 COMPREHEN METABOLIC PANEL: CPT | Performed by: REGISTERED NURSE

## 2025-07-16 PROCEDURE — 80061 LIPID PANEL: CPT | Performed by: REGISTERED NURSE

## 2025-07-16 RX ORDER — VALACYCLOVIR HYDROCHLORIDE 1 G/1
1000 TABLET, FILM COATED ORAL 2 TIMES DAILY
Qty: 14 TABLET | Refills: 0 | Status: SHIPPED | OUTPATIENT
Start: 2025-07-16 | End: 2025-07-23

## 2025-07-16 NOTE — PROGRESS NOTES
Venipuncture Blood Specimen Collection  Venipuncture performed in RT ARM by Lisseth Silvestre with good hemostasis. Patient tolerated the procedure well without complications.   07/16/25   Lisseth Silvestre

## 2025-07-16 NOTE — PROGRESS NOTES
Chief Complaint  Hypothyroidism, Blister (Pt had lips done and then had blisters come up.  Pt is requesting Valacyclovir ), and Arthritis    Subjective    History of Present Illness {CC  Problem List  Visit  Diagnosis   Encounters  Notes  Medications  Labs  Result Review Imaging  Media :23}     Sabiha Hernandez presents to [facility] for Hypothyroidism, Blister (Pt had lips done and then had blisters come up.  Pt is requesting Valacyclovir ), and Arthritis.      History of Present Illness  Patient is a 55 y.o. female who presents to the clinic today for 6-month follow-up for hypothyroidism, outbreak of HSV 1 greater than 1 week, and concerns of pain in the left foot after injury x 2 weeks.  Patient denies any chest pain, shortness of breath, or any fevers.  Patient denies any known exposure to COVID, flu, or any other contagious illnesses.       History of Present Illness  In regards to hypothyroidism, she has been managing her hypothyroidism with medication. Initially, she received a 90-day supply of 88 mcg, which was later reduced to a 30-day supply of 100 mcg after her TSH level was abnormal.  She was supposed to return to the clinic within 6 weeks to recheck but forgot to do so.  She recently collected a 30-day supply of her 100 mcg dose. She has been adhering to the prescribed dosage of 100 mcg since over the last 3 months.  Patient is agreeable to recheck her thyroid today.    In regards to HSV-1 infection, she underwent a lip blush procedure, which resulted in significant swelling and blistering. She is scheduled for a second session and has been advised to take medication three days before and after the procedure to prevent further blistering. She has a history of cold sores from a long time ago and after having this treatment she had a round of cold sores.  The provider offering a treatment recommended she take valacyclovir 3 days prior and then finish out the course  after her blister treatment has completed.  Patient would like to move forward with this and denies any further concerns with her HSV-1 infection at this time.    In regards to left foot pain, she experienced a fall where she stepped into a hole approximately 2 weeks ago, resulting in a bruise on her foot and continued pain. She sought medical attention at [facility] and a x-ray was performed at that time on July 1, 2025. The results indicated arthritis and ruled out any fractures, a condition she has not previously been diagnosed with.  Patient has tried multiple over-the-counter medications to help with her pain and discomfort without great success.  She has tried stretching and relaxing with no success.  Patient is interested in further investigating this at this time.           Review of Systems   Constitutional: Negative.  Negative for activity change, chills, diaphoresis, fatigue and fever.   HENT: Negative.  Negative for congestion, dental problem, ear pain, hearing loss, rhinorrhea, sinus pain, sore throat, tinnitus and trouble swallowing.    Eyes: Negative.  Negative for pain and visual disturbance.   Respiratory: Negative.  Negative for cough, chest tightness, shortness of breath and wheezing.    Cardiovascular: Negative.  Negative for chest pain, palpitations and leg swelling.   Gastrointestinal: Negative.  Negative for abdominal pain, diarrhea, nausea and vomiting.   Endocrine: Negative.  Negative for polydipsia, polyphagia and polyuria.   Genitourinary: Negative.  Negative for difficulty urinating, dysuria, frequency and urgency.   Musculoskeletal:  Positive for arthralgias. Negative for back pain, myalgias and neck pain.   Skin: Negative.  Negative for color change, pallor, rash and wound.   Allergic/Immunologic: Negative.  Negative for environmental allergies.   Neurological: Negative.  Negative for dizziness, speech difficulty, weakness, light-headedness, numbness and headaches.   Hematological:  "Negative.    Psychiatric/Behavioral: Negative.  Negative for confusion, decreased concentration, self-injury and suicidal ideas. The patient is not nervous/anxious.    All other systems reviewed and are negative.       Objective     Vital Signs:   /81 (BP Location: Right arm, Patient Position: Sitting, Cuff Size: Adult)   Pulse 60   Temp 97.8 °F (36.6 °C) (Temporal)   Resp 18   Ht 167.6 cm (66\")   Wt 70.9 kg (156 lb 6.4 oz)   SpO2 99%   BMI 25.24 kg/m²   Current Outpatient Medications on File Prior to Visit   Medication Sig Dispense Refill    Synthroid 100 MCG tablet Take 1 tablet by mouth Daily. 30 tablet 2     No current facility-administered medications on file prior to visit.        Past Medical History:   Diagnosis Date    Anxiety 2023    Breast cancer in female     left breast    Bunion Not sure    Cancer     Stage 0    Depression     Headache     Hypothyroidism     Thyroid removed      Past Surgical History:   Procedure Laterality Date    BREAST SURGERY Left     Right mastectomy    COLONOSCOPY      HYSTERECTOMY      complete    MASTECTOMY COMPLETE / SIMPLE Left     TOTAL THYROIDECTOMY      TUBAL ABDOMINAL LIGATION        Family History   Problem Relation Age of Onset    Thyroid disease Mother     Hypertension Mother     Osteoporosis Mother     Stroke Father     Heart disease Father     Diabetes Father     Diabetes Brother     Breast cancer Maternal Aunt     Cancer Maternal Grandmother     Colon cancer Maternal Grandmother     COPD Maternal Grandfather     Heart disease Paternal Grandmother     Heart disease Paternal Grandfather       Social History     Socioeconomic History    Marital status:    Tobacco Use    Smoking status: Former     Current packs/day: 0.00     Average packs/day: 1 pack/day for 31.0 years (31.0 ttl pk-yrs)     Types: Cigarettes     Start date: 1990     Quit date: 2017     Years since quittin.5     Passive exposure: Past    " Smokeless tobacco: Never   Vaping Use    Vaping status: Never Used   Substance and Sexual Activity    Alcohol use: Not Currently    Drug use: Never    Sexual activity: Yes     Partners: Male     Birth control/protection: Hysterectomy         No visits with results within 3 Month(s) from this visit.   Latest known visit with results is:   Clinical Support on 05/20/2024   Component Date Value Ref Range Status    Glucose 05/20/2024 87  65 - 99 mg/dL Final    BUN 05/20/2024 17  6 - 20 mg/dL Final    Creatinine 05/20/2024 0.91  0.57 - 1.00 mg/dL Final    Sodium 05/20/2024 141  136 - 145 mmol/L Final    Potassium 05/20/2024 4.2  3.5 - 5.2 mmol/L Final    Chloride 05/20/2024 105  98 - 107 mmol/L Final    CO2 05/20/2024 26.0  22.0 - 29.0 mmol/L Final    Calcium 05/20/2024 8.8  8.6 - 10.5 mg/dL Final    Total Protein 05/20/2024 6.6  6.0 - 8.5 g/dL Final    Albumin 05/20/2024 4.0  3.5 - 5.2 g/dL Final    ALT (SGPT) 05/20/2024 17  1 - 33 U/L Final    AST (SGOT) 05/20/2024 21  1 - 32 U/L Final    Alkaline Phosphatase 05/20/2024 76  39 - 117 U/L Final    Total Bilirubin 05/20/2024 0.4  0.0 - 1.2 mg/dL Final    Globulin 05/20/2024 2.6  gm/dL Final    A/G Ratio 05/20/2024 1.5  g/dL Final    BUN/Creatinine Ratio 05/20/2024 18.7  7.0 - 25.0 Final    Anion Gap 05/20/2024 10.0  5.0 - 15.0 mmol/L Final    eGFR 05/20/2024 75.6  >60.0 mL/min/1.73 Final    Total Cholesterol 05/20/2024 238 (H)  0 - 200 mg/dL Final    Triglycerides 05/20/2024 50  0 - 150 mg/dL Final    HDL Cholesterol 05/20/2024 77 (H)  40 - 60 mg/dL Final    LDL Cholesterol  05/20/2024 153 (H)  0 - 100 mg/dL Final    VLDL Cholesterol 05/20/2024 8  5 - 40 mg/dL Final    LDL/HDL Ratio 05/20/2024 1.96   Final    Hemoglobin A1C 05/20/2024 5.60  4.80 - 5.60 % Final    TSH 05/20/2024 4.570 (H)  0.270 - 4.200 uIU/mL Final    WBC 05/20/2024 3.12 (L)  3.40 - 10.80 10*3/mm3 Final    RBC 05/20/2024 4.52  3.77 - 5.28 10*6/mm3 Final    Hemoglobin 05/20/2024 13.3  12.0 - 15.9 g/dL Final     Hematocrit 05/20/2024 40.5  34.0 - 46.6 % Final    MCV 05/20/2024 89.6  79.0 - 97.0 fL Final    MCH 05/20/2024 29.4  26.6 - 33.0 pg Final    MCHC 05/20/2024 32.8  31.5 - 35.7 g/dL Final    RDW 05/20/2024 12.3  12.3 - 15.4 % Final    RDW-SD 05/20/2024 39.6  37.0 - 54.0 fl Final    MPV 05/20/2024 10.4  6.0 - 12.0 fL Final    Platelets 05/20/2024 199  140 - 450 10*3/mm3 Final    Neutrophil % 05/20/2024 54.6  42.7 - 76.0 % Final    Lymphocyte % 05/20/2024 34.9  19.6 - 45.3 % Final    Monocyte % 05/20/2024 7.7  5.0 - 12.0 % Final    Eosinophil % 05/20/2024 1.9  0.3 - 6.2 % Final    Basophil % 05/20/2024 0.6  0.0 - 1.5 % Final    Immature Grans % 05/20/2024 0.3  0.0 - 0.5 % Final    Neutrophils, Absolute 05/20/2024 1.70  1.70 - 7.00 10*3/mm3 Final    Lymphocytes, Absolute 05/20/2024 1.09  0.70 - 3.10 10*3/mm3 Final    Monocytes, Absolute 05/20/2024 0.24  0.10 - 0.90 10*3/mm3 Final    Eosinophils, Absolute 05/20/2024 0.06  0.00 - 0.40 10*3/mm3 Final    Basophils, Absolute 05/20/2024 0.02  0.00 - 0.20 10*3/mm3 Final    Immature Grans, Absolute 05/20/2024 0.01  0.00 - 0.05 10*3/mm3 Final    nRBC 05/20/2024 0.0  0.0 - 0.2 /100 WBC Final         Physical Exam  Vitals and nursing note reviewed.   Constitutional:       Appearance: Normal appearance. She is normal weight.   HENT:      Head: Normocephalic and atraumatic.   Cardiovascular:      Rate and Rhythm: Normal rate and regular rhythm.      Pulses: Normal pulses.      Heart sounds: Normal heart sounds. No murmur heard.     No friction rub. No gallop.   Pulmonary:      Effort: Pulmonary effort is normal. No respiratory distress.      Breath sounds: Normal breath sounds. No stridor. No wheezing, rhonchi or rales.   Chest:      Chest wall: No tenderness.   Abdominal:      General: Abdomen is flat. Bowel sounds are normal. There is no distension.      Palpations: Abdomen is soft. There is no mass.      Tenderness: There is no abdominal tenderness. There is no right CVA  tenderness, left CVA tenderness, guarding or rebound.      Hernia: No hernia is present.   Skin:     General: Skin is warm and dry.      Capillary Refill: Capillary refill takes less than 2 seconds.      Coloration: Skin is not jaundiced or pale.   Neurological:      General: No focal deficit present.      Mental Status: She is alert and oriented to person, place, and time. Mental status is at baseline.      Motor: No weakness.      Coordination: Coordination normal.      Gait: Gait normal.   Psychiatric:         Mood and Affect: Mood normal.         Behavior: Behavior normal.         Thought Content: Thought content normal.         Judgment: Judgment normal.          Physical Exam  Mouth/Throat: Blisters observed on lips.  Extremities: Bruising and swelling noted on left foot.  Skin: Red rash observed on left foot.       Result Review  Data Reviewed:{ Labs  Result Review  Imaging  Med Tab  Media :23}   I have reviewed this patient's chart.  I have reviewed previous labs, previous imaging, previous medications, and previous encounters with notes that were available in this patient's chart.    Results  Imaging   - X-ray of the left foot: Showed arthritis.              Assessment and Plan {CC Problem List  Visit Diagnosis  ROS  Review (Popup)  [facility]  Quality  BestPractice  Medications  SmartSets  SnapShot Encounters  Media :23}   Diagnoses and all orders for this visit:    1. Hypothyroidism, unspecified type (Primary)  -     TSH    2. Need for hepatitis C screening test  -     Hepatitis C Antibody    3. Mixed hyperlipidemia  -     Lipid Panel    4. Screening for diabetes mellitus  -     Hemoglobin A1c    5. Screening for endocrine, metabolic and immunity disorder  -     CBC & Differential  -     Comprehensive Metabolic Panel    6. HSV-1 (herpes simplex virus 1) infection  -     valACYclovir (Valtrex) 1000 MG tablet; Take 1 tablet by mouth 2 (Two) Times a Day for 7 days.  Dispense: 14  tablet; Refill: 0    7. Acute foot pain, left  -     Cancel: MRI Foot Left Without Contrast; Future  -     MRI Foot Left Without Contrast; Future        Assessment & Plan  1. Hypothyroidism.  - Synthroid 100 mcg has been taken since 03/2025.  - Lab test ordered today to assess thyroid levels.  - If TSH level is stable, continue with 90-day supply; if inconsistent, recheck every 6 weeks until normalized.  - Medication sent to pharmacy.    2. Lip blisters.  - Prescription for valacyclovir sent to pharmacy.  - Advised to take valacyclovir 3 days before and 3 days after lip procedure to prevent blisters.  - History of cold sores noted.    3. Left foot bruise.  - Persistent swelling following a fall and injury a few weeks ago.  - MRI of left foot ordered; available for up to a year, call to have it faxed when ready.  - If swelling persists, inform the office.       -  -ER red flags discussed with patient including risk versus benefit and education provided.  -Follow-up with me in 2 to 4 weeks if it is not improving or sooner if needed.    I spent 40 minutes caring for Sabiha on this date of service. This time includes time spent by me in the following activities:preparing for the visit, reviewing tests, obtaining and/or reviewing a separately obtained history, performing a medically appropriate examination and/or evaluation , counseling and educating the patient/family/caregiver, ordering medications, tests, or procedures, referring and communicating with other health care professionals , documenting information in the medical record, independently interpreting results and communicating that information with the patient/family/caregiver, and care coordination.    Follow Up {Instructions Charge Capture  Follow-up Communications :23}     Patient was given instructions and counseling regarding her condition or for health maintenance advice. Please see specific information pulled into the AVS (placed there by myself) if  appropriate.    Return in about 4 weeks (around 8/13/2025), or if symptoms worsen or fail to improve, for Recheck.    BMI is >= 25 and <30. (Overweight) The following options were offered after discussion;: exercise counseling/recommendations and nutrition counseling/recommendations         GWENDOLYN Valdez, [facility]-BC      Patient or patient representative verbalized consent for the use of Ambient Listening during the visit with  GWENDOLYN Valdez for chart documentation. 7/16/2025  12:46 EDT

## 2025-07-17 LAB
ALBUMIN SERPL-MCNC: 4.3 G/DL (ref 3.5–5.2)
ALBUMIN/GLOB SERPL: 1.6 G/DL
ALP SERPL-CCNC: 86 U/L (ref 39–117)
ALT SERPL W P-5'-P-CCNC: 12 U/L (ref 1–33)
ANION GAP SERPL CALCULATED.3IONS-SCNC: 10.1 MMOL/L (ref 5–15)
AST SERPL-CCNC: 20 U/L (ref 1–32)
BASOPHILS # BLD AUTO: 0.02 10*3/MM3 (ref 0–0.2)
BASOPHILS NFR BLD AUTO: 0.5 % (ref 0–1.5)
BILIRUB SERPL-MCNC: 0.4 MG/DL (ref 0–1.2)
BUN SERPL-MCNC: 23 MG/DL (ref 6–20)
BUN/CREAT SERPL: 25.8 (ref 7–25)
CALCIUM SPEC-SCNC: 9.3 MG/DL (ref 8.6–10.5)
CHLORIDE SERPL-SCNC: 105 MMOL/L (ref 98–107)
CHOLEST SERPL-MCNC: 257 MG/DL (ref 0–200)
CO2 SERPL-SCNC: 25.9 MMOL/L (ref 22–29)
CREAT SERPL-MCNC: 0.89 MG/DL (ref 0.57–1)
DEPRECATED RDW RBC AUTO: 39.7 FL (ref 37–54)
EGFRCR SERPLBLD CKD-EPI 2021: 77.2 ML/MIN/1.73
EOSINOPHIL # BLD AUTO: 0.05 10*3/MM3 (ref 0–0.4)
EOSINOPHIL NFR BLD AUTO: 1.3 % (ref 0.3–6.2)
ERYTHROCYTE [DISTWIDTH] IN BLOOD BY AUTOMATED COUNT: 12.3 % (ref 12.3–15.4)
GLOBULIN UR ELPH-MCNC: 2.7 GM/DL
GLUCOSE SERPL-MCNC: 78 MG/DL (ref 65–99)
HBA1C MFR BLD: 5.5 % (ref 4.8–5.6)
HCT VFR BLD AUTO: 42 % (ref 34–46.6)
HCV AB SER QL: NORMAL
HDLC SERPL-MCNC: 81 MG/DL (ref 40–60)
HGB BLD-MCNC: 13.7 G/DL (ref 12–15.9)
IMM GRANULOCYTES # BLD AUTO: 0.01 10*3/MM3 (ref 0–0.05)
IMM GRANULOCYTES NFR BLD AUTO: 0.3 % (ref 0–0.5)
LDLC SERPL CALC-MCNC: 167 MG/DL (ref 0–100)
LDLC/HDLC SERPL: 2.03 {RATIO}
LYMPHOCYTES # BLD AUTO: 1.31 10*3/MM3 (ref 0.7–3.1)
LYMPHOCYTES NFR BLD AUTO: 35.1 % (ref 19.6–45.3)
MCH RBC QN AUTO: 29.8 PG (ref 26.6–33)
MCHC RBC AUTO-ENTMCNC: 32.6 G/DL (ref 31.5–35.7)
MCV RBC AUTO: 91.3 FL (ref 79–97)
MONOCYTES # BLD AUTO: 0.25 10*3/MM3 (ref 0.1–0.9)
MONOCYTES NFR BLD AUTO: 6.7 % (ref 5–12)
NEUTROPHILS NFR BLD AUTO: 2.09 10*3/MM3 (ref 1.7–7)
NEUTROPHILS NFR BLD AUTO: 56.1 % (ref 42.7–76)
NRBC BLD AUTO-RTO: 0 /100 WBC (ref 0–0.2)
PLATELET # BLD AUTO: 208 10*3/MM3 (ref 140–450)
PMV BLD AUTO: 10.8 FL (ref 6–12)
POTASSIUM SERPL-SCNC: 4.4 MMOL/L (ref 3.5–5.2)
PROT SERPL-MCNC: 7 G/DL (ref 6–8.5)
RBC # BLD AUTO: 4.6 10*6/MM3 (ref 3.77–5.28)
SODIUM SERPL-SCNC: 141 MMOL/L (ref 136–145)
TRIGL SERPL-MCNC: 59 MG/DL (ref 0–150)
TSH SERPL DL<=0.05 MIU/L-ACNC: 0.82 UIU/ML (ref 0.27–4.2)
VLDLC SERPL-MCNC: 9 MG/DL (ref 5–40)
WBC NRBC COR # BLD AUTO: 3.73 10*3/MM3 (ref 3.4–10.8)

## 2025-08-10 DIAGNOSIS — E03.9 HYPOTHYROIDISM, UNSPECIFIED TYPE: ICD-10-CM

## 2025-08-11 RX ORDER — LEVOTHYROXINE SODIUM 100 MCG
100 TABLET ORAL DAILY
Qty: 30 TABLET | Refills: 2 | Status: SHIPPED | OUTPATIENT
Start: 2025-08-11